# Patient Record
Sex: MALE | Race: WHITE | NOT HISPANIC OR LATINO | Employment: FULL TIME | URBAN - METROPOLITAN AREA
[De-identification: names, ages, dates, MRNs, and addresses within clinical notes are randomized per-mention and may not be internally consistent; named-entity substitution may affect disease eponyms.]

---

## 2019-08-20 ENCOUNTER — HOSPITAL ENCOUNTER (EMERGENCY)
Facility: MEDICAL CENTER | Age: 54
End: 2019-08-20
Attending: EMERGENCY MEDICINE
Payer: COMMERCIAL

## 2019-08-20 ENCOUNTER — APPOINTMENT (OUTPATIENT)
Dept: RADIOLOGY | Facility: MEDICAL CENTER | Age: 54
End: 2019-08-20
Payer: COMMERCIAL

## 2019-08-20 VITALS
SYSTOLIC BLOOD PRESSURE: 136 MMHG | HEIGHT: 70 IN | DIASTOLIC BLOOD PRESSURE: 90 MMHG | WEIGHT: 215 LBS | HEART RATE: 82 BPM | OXYGEN SATURATION: 97 % | TEMPERATURE: 98 F | BODY MASS INDEX: 30.78 KG/M2 | RESPIRATION RATE: 18 BRPM
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
## 2019-08-20 PROCEDURE — 99284 EMERGENCY DEPT VISIT MOD MDM: CPT

## 2019-08-20 PROCEDURE — 700111 HCHG RX REV CODE 636 W/ 250 OVERRIDE (IP): Performed by: EMERGENCY MEDICINE

## 2019-08-20 PROCEDURE — 307740 HCHG GREEN TRAUMA TEAM SERVICES

## 2019-08-20 PROCEDURE — 96372 THER/PROPH/DIAG INJ SC/IM: CPT

## 2019-08-20 RX ORDER — KETOROLAC TROMETHAMINE 30 MG/ML
60 INJECTION, SOLUTION INTRAMUSCULAR; INTRAVENOUS ONCE
Status: COMPLETED | OUTPATIENT
Start: 2019-08-20 | End: 2019-08-20

## 2019-08-20 RX ADMIN — KETOROLAC TROMETHAMINE 60 MG: 30 INJECTION, SOLUTION INTRAMUSCULAR at 20:57

## 2019-08-20 SDOH — HEALTH STABILITY: MENTAL HEALTH: HOW OFTEN DO YOU HAVE A DRINK CONTAINING ALCOHOL?: MONTHLY OR LESS

## 2019-08-20 SDOH — HEALTH STABILITY: MENTAL HEALTH: HOW MANY STANDARD DRINKS CONTAINING ALCOHOL DO YOU HAVE ON A TYPICAL DAY?: 1 OR 2

## 2019-08-20 SDOH — HEALTH STABILITY: MENTAL HEALTH: HOW OFTEN DO YOU HAVE 6 OR MORE DRINKS ON ONE OCCASION?: NEVER
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
## 2019-08-21 NOTE — ED NOTES
Pt discharged home. Explained discharge and medication instructions. Questions and comments addressed. Pt verbalized understanding of instructions. Pt advised to follow-up with Occupational Health or return to ED for any new or worsening of symptoms. Pt is ambulating well and steady on feet. VS stable. 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

## 2020-07-21 ENCOUNTER — HOSPITAL ENCOUNTER (OUTPATIENT)
Dept: LAB | Facility: MEDICAL CENTER | Age: 55
End: 2020-07-21
Attending: FAMILY MEDICINE
Payer: COMMERCIAL

## 2020-07-21 LAB
BASOPHILS # BLD AUTO: 0.6 % (ref 0–1.8)
BASOPHILS # BLD: 0.03 K/UL (ref 0–0.12)
EOSINOPHIL # BLD AUTO: 0.04 K/UL (ref 0–0.51)
EOSINOPHIL NFR BLD: 0.8 % (ref 0–6.9)
ERYTHROCYTE [DISTWIDTH] IN BLOOD BY AUTOMATED COUNT: 44.6 FL (ref 35.9–50)
ESTRADIOL SERPL-MCNC: 28.3 PG/ML
HCT VFR BLD AUTO: 49 % (ref 42–52)
HGB BLD-MCNC: 16.7 G/DL (ref 14–18)
IMM GRANULOCYTES # BLD AUTO: 0.02 K/UL (ref 0–0.11)
IMM GRANULOCYTES NFR BLD AUTO: 0.4 % (ref 0–0.9)
LYMPHOCYTES # BLD AUTO: 1.11 K/UL (ref 1–4.8)
LYMPHOCYTES NFR BLD: 21 % (ref 22–41)
MCH RBC QN AUTO: 32.7 PG (ref 27–33)
MCHC RBC AUTO-ENTMCNC: 34.1 G/DL (ref 33.7–35.3)
MCV RBC AUTO: 95.9 FL (ref 81.4–97.8)
MONOCYTES # BLD AUTO: 0.42 K/UL (ref 0–0.85)
MONOCYTES NFR BLD AUTO: 8 % (ref 0–13.4)
NEUTROPHILS # BLD AUTO: 3.66 K/UL (ref 1.82–7.42)
NEUTROPHILS NFR BLD: 69.2 % (ref 44–72)
NRBC # BLD AUTO: 0 K/UL
NRBC BLD-RTO: 0 /100 WBC
PLATELET # BLD AUTO: 163 K/UL (ref 164–446)
PMV BLD AUTO: 11.5 FL (ref 9–12.9)
PROLACTIN SERPL-MCNC: 13.8 NG/ML (ref 2.1–17.7)
RBC # BLD AUTO: 5.11 M/UL (ref 4.7–6.1)
TESTOST SERPL-MCNC: 548 NG/DL (ref 175–781)
VIT B12 SERPL-MCNC: 603 PG/ML (ref 211–911)
WBC # BLD AUTO: 5.3 K/UL (ref 4.8–10.8)

## 2020-07-21 PROCEDURE — 82670 ASSAY OF TOTAL ESTRADIOL: CPT

## 2020-07-21 PROCEDURE — 84403 ASSAY OF TOTAL TESTOSTERONE: CPT

## 2020-07-21 PROCEDURE — 84146 ASSAY OF PROLACTIN: CPT

## 2020-07-21 PROCEDURE — 82607 VITAMIN B-12: CPT

## 2020-07-21 PROCEDURE — 82542 COL CHROMOTOGRAPHY QUAL/QUAN: CPT

## 2020-07-21 PROCEDURE — 36415 COLL VENOUS BLD VENIPUNCTURE: CPT

## 2020-07-21 PROCEDURE — 85025 COMPLETE CBC W/AUTO DIFF WBC: CPT

## 2020-07-26 LAB — ANDROSTANOLONE SERPL-MCNC: 608.8 PG/ML (ref 106–719)

## 2021-01-29 ENCOUNTER — HOSPITAL ENCOUNTER (OUTPATIENT)
Dept: LAB | Facility: MEDICAL CENTER | Age: 56
End: 2021-01-29
Attending: FAMILY MEDICINE
Payer: COMMERCIAL

## 2021-01-29 LAB
ANION GAP SERPL CALC-SCNC: 8 MMOL/L (ref 7–16)
BUN SERPL-MCNC: 15 MG/DL (ref 8–22)
CALCIUM SERPL-MCNC: 9.6 MG/DL (ref 8.5–10.5)
CHLORIDE SERPL-SCNC: 103 MMOL/L (ref 96–112)
CHOLEST SERPL-MCNC: 265 MG/DL (ref 100–199)
CO2 SERPL-SCNC: 27 MMOL/L (ref 20–33)
CREAT SERPL-MCNC: 1.24 MG/DL (ref 0.5–1.4)
ERYTHROCYTE [DISTWIDTH] IN BLOOD BY AUTOMATED COUNT: 42.9 FL (ref 35.9–50)
ESTRADIOL SERPL-MCNC: <5 PG/ML
FASTING STATUS PATIENT QL REPORTED: NORMAL
GLUCOSE SERPL-MCNC: 86 MG/DL (ref 65–99)
HCT VFR BLD AUTO: 50.5 % (ref 42–52)
HDLC SERPL-MCNC: 47 MG/DL
HGB BLD-MCNC: 17.3 G/DL (ref 14–18)
LDLC SERPL CALC-MCNC: 198 MG/DL
MCH RBC QN AUTO: 32.2 PG (ref 27–33)
MCHC RBC AUTO-ENTMCNC: 34.3 G/DL (ref 33.7–35.3)
MCV RBC AUTO: 94 FL (ref 81.4–97.8)
PLATELET # BLD AUTO: 167 K/UL (ref 164–446)
PMV BLD AUTO: 11.4 FL (ref 9–12.9)
POTASSIUM SERPL-SCNC: 4.3 MMOL/L (ref 3.6–5.5)
RBC # BLD AUTO: 5.37 M/UL (ref 4.7–6.1)
SODIUM SERPL-SCNC: 138 MMOL/L (ref 135–145)
TRIGL SERPL-MCNC: 101 MG/DL (ref 0–149)
WBC # BLD AUTO: 4.4 K/UL (ref 4.8–10.8)

## 2021-01-29 PROCEDURE — 36415 COLL VENOUS BLD VENIPUNCTURE: CPT

## 2021-01-29 PROCEDURE — 84402 ASSAY OF FREE TESTOSTERONE: CPT

## 2021-01-29 PROCEDURE — 80061 LIPID PANEL: CPT

## 2021-01-29 PROCEDURE — 84270 ASSAY OF SEX HORMONE GLOBUL: CPT

## 2021-01-29 PROCEDURE — 84403 ASSAY OF TOTAL TESTOSTERONE: CPT

## 2021-01-29 PROCEDURE — 80048 BASIC METABOLIC PNL TOTAL CA: CPT

## 2021-01-29 PROCEDURE — 82670 ASSAY OF TOTAL ESTRADIOL: CPT

## 2021-01-29 PROCEDURE — 85027 COMPLETE CBC AUTOMATED: CPT

## 2021-01-31 LAB
SHBG SERPL-SCNC: 36 NMOL/L (ref 11–80)
TESTOST FREE MFR SERPL: 2.1 % (ref 1.6–2.9)
TESTOST FREE SERPL-MCNC: 232 PG/ML (ref 47–244)
TESTOST SERPL-MCNC: 1111 NG/DL (ref 300–890)

## 2021-04-09 ENCOUNTER — TELEPHONE (OUTPATIENT)
Dept: CARDIOLOGY | Facility: MEDICAL CENTER | Age: 56
End: 2021-04-09

## 2021-04-09 NOTE — TELEPHONE ENCOUNTER
Chart Prep    Called patient in regards to records for NP appointment with Dr. Downing on 4/22/2021 at 08:00am. To review most recent lab results, ekg, any cardiac testing or ,if she has been treated by a cardiologist. No answer, left voicemail to call back

## 2021-04-09 NOTE — TELEPHONE ENCOUNTER
Requested records from   [facility]  Phone#: [phone] Fax# [phone]  [facility]  Phone# [phone] Fax# [phone]  [facility]  Phone# [phone] Fax# [phone]    Confirmation fax received and scanned into [employer].

## 2021-04-09 NOTE — TELEPHONE ENCOUNTER
Patient called back and stated   That he's had Echo, labs, X-ray's, and ASHLEY done with Dr Ramesh Bejarano with [facility]. Patient did state that Dr Bejarano was going to reach out to TW. Previous cardio was Dr Colmenares with [facility] and Dr Silver [facility] with [facility].    Thank you,    Cyndi CATALAN

## 2021-04-19 ENCOUNTER — HOSPITAL ENCOUNTER (OUTPATIENT)
Dept: RADIOLOGY | Facility: MEDICAL CENTER | Age: 56
End: 2021-04-19
Payer: COMMERCIAL

## 2021-04-19 NOTE — TELEPHONE ENCOUNTER
Pt called asking if you received a CD of his ASHLEY. Pt also would like to know if the pulmonary records came. Please call Pt back at [phone].    Thank you

## 2021-04-20 NOTE — TELEPHONE ENCOUNTER
"Pt called back stating they have more info for you. They said that the image is too big to it on a disk so they are \"going to push it to the [facility] system. Please call the Pt back at [phone].    Thank you,  Mari GARCIA"

## 2021-04-20 NOTE — TELEPHONE ENCOUNTER
Called patient and notified him that records from [facility] was still not received. Patient states he has hard copies of his pulmonary chart and will bring in during his visit to scan into his chart. Have not received CD of ASHLEY as well. Patient will try to retrieve the CD in person at [facility].

## 2021-04-21 ENCOUNTER — HOSPITAL ENCOUNTER (OUTPATIENT)
Dept: RADIOLOGY | Facility: MEDICAL CENTER | Age: 56
End: 2021-04-21
Payer: COMMERCIAL

## 2021-04-22 ENCOUNTER — OFFICE VISIT (OUTPATIENT)
Dept: CARDIOLOGY | Facility: MEDICAL CENTER | Age: 56
End: 2021-04-22
Payer: COMMERCIAL

## 2021-04-22 VITALS
RESPIRATION RATE: 14 BRPM | OXYGEN SATURATION: 96 % | SYSTOLIC BLOOD PRESSURE: 142 MMHG | DIASTOLIC BLOOD PRESSURE: 88 MMHG | HEIGHT: 70 IN | WEIGHT: 219 LBS | BODY MASS INDEX: 31.35 KG/M2 | HEART RATE: 100 BPM

## 2021-04-22 DIAGNOSIS — Q21.12 PFO (PATENT FORAMEN OVALE): ICD-10-CM

## 2021-04-22 DIAGNOSIS — R09.02 HYPOXIA: ICD-10-CM

## 2021-04-22 DIAGNOSIS — Z79.890 LONG-TERM CURRENT USE OF TESTOSTERONE REPLACEMENT THERAPY: ICD-10-CM

## 2021-04-22 DIAGNOSIS — R06.02 SOB (SHORTNESS OF BREATH): ICD-10-CM

## 2021-04-22 PROCEDURE — 99204 OFFICE O/P NEW MOD 45 MIN: CPT | Performed by: INTERNAL MEDICINE

## 2021-04-22 PROCEDURE — 93000 ELECTROCARDIOGRAM COMPLETE: CPT | Performed by: INTERNAL MEDICINE

## 2021-04-22 RX ORDER — TESTOSTERONE CYPIONATE 200 MG/ML
200 INJECTION, SOLUTION INTRAMUSCULAR
COMMUNITY
Start: 2021-02-05 | End: 2021-09-01

## 2021-04-22 RX ORDER — ANASTROZOLE 1 MG/1
1 TABLET ORAL
COMMUNITY
Start: 2021-02-19

## 2021-04-23 LAB — EKG IMPRESSION: NORMAL

## 2021-04-27 ENCOUNTER — TELEPHONE (OUTPATIENT)
Dept: CARDIOLOGY | Facility: MEDICAL CENTER | Age: 56
End: 2021-04-27

## 2021-04-27 NOTE — TELEPHONE ENCOUNTER
Patient called with correct fax number. Please fax notes when done to [phone].    Thank you,    Cyndi CATALAN

## 2021-04-27 NOTE — TELEPHONE ENCOUNTER
FITO    Pt called asking if the  Visit notes from 4/22 could be emailed to him at [email or web address] or faxed to   Fax# [phone].    Thank you

## 2021-04-28 PROBLEM — Z79.890 LONG-TERM CURRENT USE OF TESTOSTERONE REPLACEMENT THERAPY: Status: ACTIVE | Noted: 2021-04-28

## 2021-04-28 PROBLEM — Q21.12 PFO (PATENT FORAMEN OVALE): Status: ACTIVE | Noted: 2021-04-28

## 2021-04-28 PROBLEM — R09.02 HYPOXIA: Status: ACTIVE | Noted: 2021-04-28

## 2021-04-28 NOTE — TELEPHONE ENCOUNTER
Discussed with TW, notes signed, ok to e-mail to pt. Notes sent via e-mail to pt's provided e-mail address.

## 2021-04-28 NOTE — PROGRESS NOTES
Chief Complaint   Patient presents with   • Shortness of Breath       Subjective:   Jason Ray is a 55 y.o. male who presents today for initial consultation regarding exercise-induced hypoxia and dyspnea on exertion.  He is a very active and lifelong athletic  who was completing his yearly VO2 max testing October 2020 when he was noted to have a sudden onset of shortness of breath at peak exercise and sustained desaturation early into recovery down into the mid 80% range.  He does note that he has been more dyspneic on exertion with peak activity in the recent past.  Underwent cardiovascular evaluation including echocardiogram showing right to left shunting with bubble study and subsequent ASHLEY showing a small PFO.  Extensive records are reviewed today and will be chronicled below.  He does not smoke drink excessively or do drugs.  Family history is reviewed and not pertinent.  He is seeking a second opinion.  This has precluded him from working as a .  He has seen pulmonary medicine who feel that there is no pulmonary etiology at play.  These records are reviewed and summarized as follows: Mild restrictive defect on PFTs with normal corrected DLCO.  High-resolution CT scan was pending at the time of that documentation.  Notably the patient also brings recordings of his oxygen saturations during strenuous activity subsequently and at peak exercise with the development of dyspnea there are precipitous drops in his systemic oxygen saturation as read by his pulse oximeter.      History reviewed. No pertinent past medical history.  History reviewed. No pertinent surgical history.  History reviewed. No pertinent family history.  Social History     Socioeconomic History   • Marital status:      Spouse name: Not on file   • Number of children: Not on file   • Years of education: Not on file   • Highest education level: Not on file   Occupational History   • Not on file   Tobacco Use   •  "Smoking status: Never Smoker   • Smokeless tobacco: Never Used   Substance and Sexual Activity   • Alcohol use: Yes   • Drug use: Never   • Sexual activity: Not on file   Other Topics Concern   • Not on file   Social History Narrative   • Not on file     Social Determinants of Health     Financial Resource Strain:    • Difficulty of Paying Living Expenses:    Food Insecurity:    • Worried About Running Out of Food in the Last Year:    • Ran Out of Food in the Last Year:    Transportation Needs:    • Lack of Transportation (Medical):    • Lack of Transportation (Non-Medical):    Physical Activity:    • Days of Exercise per Week:    • Minutes of Exercise per Session:    Stress:    • Feeling of Stress :    Social Connections:    • Frequency of Communication with Friends and Family:    • Frequency of Social Gatherings with Friends and Family:    • Attends Anabaptism Services:    • Active Member of Clubs or Organizations:    • Attends Club or Organization Meetings:    • Marital Status:    Intimate Partner Violence:    • Fear of Current or Ex-Partner:    • Emotionally Abused:    • Physically Abused:    • Sexually Abused:      No Known Allergies  Outpatient Encounter Medications as of 4/22/2021   Medication Sig Dispense Refill   • anastrozole (ARIMIDEX) 1 MG Tab Take 1 mg by mouth every day.     • testosterone cypionate (DEPO-TESTOSTERONE) 200 MG/ML Solution injection 200 mg.     • MAGNESIUM CARBONATE PO Take  by mouth.       No facility-administered encounter medications on file as of 4/22/2021.     Review of Systems   All other systems reviewed and are negative.       Objective:   /88 (BP Location: Left arm, Patient Position: Sitting, BP Cuff Size: Adult)   Pulse 100   Resp 14   Ht 1.778 m (5' 10\")   Wt 99.3 kg (219 lb)   SpO2 96%   BMI 31.42 kg/m²     Physical Exam   Constitutional: He is oriented to person, place, and time. He appears well-developed and well-nourished. No distress.   Athletic  Appears " younger than stated age   HENT:   Head: Normocephalic and atraumatic.   Right Ear: External ear normal.   Left Ear: External ear normal.   Eyes: Pupils are equal, round, and reactive to light. Conjunctivae and EOM are normal. Right eye exhibits no discharge. Left eye exhibits no discharge. No scleral icterus.   Neck: No JVD present. No tracheal deviation present. No thyromegaly present.   Cardiovascular: Normal rate, regular rhythm and intact distal pulses. PMI is not displaced. Exam reveals no gallop and no friction rub.   No murmur heard.  Pulses:       Carotid pulses are 2+ on the right side and 2+ on the left side.       Radial pulses are 2+ on the left side.        Popliteal pulses are 2+ on the right side and 2+ on the left side.        Dorsalis pedis pulses are 2+ on the right side and 2+ on the left side.        Posterior tibial pulses are 2+ on the right side and 2+ on the left side.   Pulmonary/Chest: Effort normal and breath sounds normal. No respiratory distress. He has no wheezes. He has no rales. He exhibits no tenderness.   Abdominal: Soft. Bowel sounds are normal. He exhibits no distension. There is no abdominal tenderness.   Musculoskeletal:         General: No tenderness, deformity or edema. Normal range of motion.      Cervical back: Normal range of motion and neck supple.   Neurological: He is alert and oriented to person, place, and time. No cranial nerve deficit (cranial nerves II through XII grossly intact). Coordination normal.   Skin: Skin is warm and dry. No rash noted. He is not diaphoretic. No erythema. No pallor. Nails show clubbing.   Psychiatric: He has a normal mood and affect. His behavior is normal. Thought content normal.   Vitals reviewed.    LABS:  Lab Results   Component Value Date/Time    CHOLSTRLTOT 265 (H) 01/29/2021 11:32 AM     (H) 01/29/2021 11:32 AM    HDL 47 01/29/2021 11:32 AM    TRIGLYCERIDE 101 01/29/2021 11:32 AM       Lab Results   Component Value Date/Time     WBC 4.4 (L) 01/29/2021 11:32 AM    RBC 5.37 01/29/2021 11:32 AM    HEMOGLOBIN 17.3 01/29/2021 11:32 AM    HEMATOCRIT 50.5 01/29/2021 11:32 AM    MCV 94.0 01/29/2021 11:32 AM    NEUTSPOLYS 69.20 07/21/2020 12:53 PM    LYMPHOCYTES 21.00 (L) 07/21/2020 12:53 PM    MONOCYTES 8.00 07/21/2020 12:53 PM    EOSINOPHILS 0.80 07/21/2020 12:53 PM    BASOPHILS 0.60 07/21/2020 12:53 PM     Lab Results   Component Value Date/Time    SODIUM 138 01/29/2021 11:32 AM    POTASSIUM 4.3 01/29/2021 11:32 AM    CHLORIDE 103 01/29/2021 11:32 AM    CO2 27 01/29/2021 11:32 AM    GLUCOSE 86 01/29/2021 11:32 AM    BUN 15 01/29/2021 11:32 AM    CREATININE 1.24 01/29/2021 11:32 AM         No results found for: ALKPHOSPHAT, ASTSGOT, ALTSGPT, TBILIRUBIN   No results found for: BNPBTYPENAT   No results found for: TSH  No results found for: PROTHROMBTM, INR     Transesophageal echocardiogram (2/18/2021):  Normal left jugular size function LVEF 60 to 65% with mild LVH.  Mildly dilated right atrium.  No significant valvular abnormalities.  Normal RV size wall thickness and function.  Saline bubble study performed with few bubbles crossing the interatrial septum most consistent with a small PFO.  Completed by Dr. Valerio. [facility] cardiology.  Result located in media.    CT chest without contrast (12/2/2020):  Impression: Expiratory images demonstrate mild air trapping  Result located in media completed at [facility].  Images were reviewed today.  Patient indicates he was unable to cooperate fully with breath-holding and exhaling due to shoulder discomfort.    Treadmill stress test [facility] (2/4/2021):  Good functional capacity achieving 84% maximum predicted heart rate, 13.4 METS.  Tracings are unavailable for review.  Patient indicates it was unremarkable for ischemia.  Oxygen saturations were not recorded in the documentation available from this test today located in [place].  Physician interpretation from the notations  indicate no significant desaturations with the lowest SPO2 of 95%.    Transthoracic echocardiogram (11/11/2020):      Overnight oximetry (12/8/2020): Reviewed and mildly abnormal with an SPO2 time less than 88% of 0.8% of the recording time.  Average saturation of 90%.  No large desaturation pattern to indicate sleep disordered breathing.    Laboratory studies 2/18/2021: BUN 13, calcium 8.7, CO2 27, chloride 111, creatinine 1.30, glucose 94, calcium 4.3, sodium 142, hemoglobin 16.8, hematocrit 48.4, platelets normal, COVID-19 test negative.    Assessment:     1. Hypoxia  MR-CARDIAC MORPH/FUNC WITH & W/O   2. PFO (patent foramen ovale)     3. SOB (shortness of breath)  EKG    MR-CARDIAC MORPH/FUNC WITH & W/O   4. Long-term current use of testosterone replacement therapy         Medical Decision Making:  Today's Assessment / Status / Plan:     Unfortunately continues to suffer from some dyspnea at maximum workload.  Review of his transthoracic images with bubble study that are available in our records do show a significant right to left shunt more so than commented on during his ASLHEY.  Is likely due to his ability to participate in the Valsalva maneuver during his nonsedated transthoracic study that demonstrated the significance of this shunting.  It does appear to be significant as there is an immediate and large volume of right to left shunting on this bubble study.  This could under periods of significant Valsalva strain such as heavy workloads because desaturations.  There is some disagreement with the studies thus far reviewed regarding chamber sizes.  It seems quite significant given that he does have clubbing present on physical examination.  I would like to assess with a different modality for more accurate quantitation of his chamber sizes and to assess for any anomalous venous return or other sources of shunting outside of his PFO.  If nothing is revealing, consideration for left and right heart  catheterization with shunt run would be reasonable and if relatively normal, it would be very reasonable to consider PFO closure with expectant management to see if this resolves his exertional hypoxemia.  If it does not then I would recommend specialty consultation at a tertiary care center such as the [facility], etc.    With regard activity he indicates he has been told not to do any cardio.  He only has symptoms at maximum exertion and completed a maximum treadmill test with 13 METS of exertion and no desaturations recently.  I suggest he return to symptom tolerated moderate cardiovascular activity during the remainder of his evaluation.  So that deconditioning does not become a concomitant issue for him, which he indicates it already has begun to be present.    He will follow-up after testing.    Thank you for this interesting consultation. It was my pleasure to see Jason Ray today.    Antonio Downing MD, FACC, [facility]  Division of Interventional Cardiology  [facility]

## 2021-07-02 ENCOUNTER — HOSPITAL ENCOUNTER (OUTPATIENT)
Dept: RADIOLOGY | Facility: MEDICAL CENTER | Age: 56
End: 2021-07-02
Attending: INTERNAL MEDICINE
Payer: COMMERCIAL

## 2021-07-02 DIAGNOSIS — R09.02 HYPOXIA: ICD-10-CM

## 2021-07-02 DIAGNOSIS — R06.02 SOB (SHORTNESS OF BREATH): ICD-10-CM

## 2021-07-02 PROCEDURE — 700117 HCHG RX CONTRAST REV CODE 255: Performed by: INTERNAL MEDICINE

## 2021-07-02 PROCEDURE — 75561 CARDIAC MRI FOR MORPH W/DYE: CPT

## 2021-07-02 PROCEDURE — A9577 INJ MULTIHANCE: HCPCS | Performed by: INTERNAL MEDICINE

## 2021-07-02 RX ADMIN — GADOBENATE DIMEGLUMINE 20 ML: 529 INJECTION, SOLUTION INTRAVENOUS at 14:30

## 2021-07-08 ENCOUNTER — TELEPHONE (OUTPATIENT)
Dept: CARDIOLOGY | Facility: MEDICAL CENTER | Age: 56
End: 2021-07-08

## 2021-07-08 NOTE — TELEPHONE ENCOUNTER
TW    Patient called and would like the results of his MRI Cardiac test. Please call him.    Thank you,    Cyndi CATALAN

## 2021-07-08 NOTE — TELEPHONE ENCOUNTER
TW    Patient called and would like to get the results of his Cardiac test done on 7-2. Please call him.    Thank you,    Cyndi CATALAN

## 2021-08-05 ENCOUNTER — PATIENT MESSAGE (OUTPATIENT)
Dept: CARDIOLOGY | Facility: MEDICAL CENTER | Age: 56
End: 2021-08-05

## 2021-08-05 NOTE — PATIENT COMMUNICATION
Patient is needing an answer regarding his MR- Cardiac.  Test was borderline and TW wanting LS (?) to review.  I told him I would try to contact someone who could help.    To LS:  Please advise if you can assist, or who I should redirect this to.

## 2021-08-07 NOTE — PROGRESS NOTES
I spoke to Dr. Downing about the MRI.    I think the images were adequate to exclude anomalous venous return.  The right-sided chambers were not significantly enlarged. No shunt appreciated.    I believe Dr. Downing is considering a right heart catheterization with shunt run.    LS

## 2021-08-09 ENCOUNTER — TELEPHONE (OUTPATIENT)
Dept: CARDIOLOGY | Facility: MEDICAL CENTER | Age: 56
End: 2021-08-09

## 2021-08-09 NOTE — TELEPHONE ENCOUNTER
Nirali Sen M.D. 3 days ago        I spoke to Dr. Downing about the MRI.     I think the images were adequate to exclude anomalous venous return.  The right-sided chambers were not significantly enlarged. No shunt appreciated.     I believe Dr. Downing is considering a right heart catheterization with shunt run.     LS        -----------------------------------------------------------------------    Contacted pt and provided him with the above interpretation from Dr. Sen. Explained that further interpretation would need to come from Dr. Downing at his upcoming appt on the 13th. Briefly explained the right heart cath procedure and the possible indications. Pt will prepare his additional questions for his upcoming appt.

## 2021-08-09 NOTE — TELEPHONE ENCOUNTER
Regarding: FW: Test Result Question  Contact: [phone]  Vincent- Can you please call patient ASAP and let him know the possible plan, and schedule with TW if needed (probable).  ----- Message -----  From: Jeniffer Ramos R.N.  Sent: 8/5/2021  11:59 AM PDT  To: Jeniffer Ramos R.N., #  Subject: Test Result Question                             ----- Message from Jeniffer Ramos R.N. sent at 8/5/2021 11:59 AM PDT -----       ----- Message from Jason Ray to Antonio Downing M.D. sent at 8/5/2021  8:21 AM -----   I have an appointment on 8/13 and I am not aware if a cardiology radiologist had read my cardiac MRI from 7/2. Any information would be helpful. ThanksNaya

## 2021-08-12 NOTE — PATIENT COMMUNICATION
Antonio Downing M.D.  You 19 hours ago (2:56 PM)     I called and left a message reiterating and summarizing these things.  Plan to see him on his upcoming appointment to discuss further.      Noted. Pt has FV with TW 08/13/21.

## 2021-08-13 ENCOUNTER — OFFICE VISIT (OUTPATIENT)
Dept: CARDIOLOGY | Facility: MEDICAL CENTER | Age: 56
End: 2021-08-13
Payer: COMMERCIAL

## 2021-08-13 VITALS
DIASTOLIC BLOOD PRESSURE: 88 MMHG | WEIGHT: 214 LBS | HEIGHT: 70 IN | OXYGEN SATURATION: 98 % | HEART RATE: 64 BPM | SYSTOLIC BLOOD PRESSURE: 140 MMHG | BODY MASS INDEX: 30.64 KG/M2

## 2021-08-13 DIAGNOSIS — R06.02 SOB (SHORTNESS OF BREATH): ICD-10-CM

## 2021-08-13 DIAGNOSIS — Q21.12 PFO (PATENT FORAMEN OVALE): ICD-10-CM

## 2021-08-13 DIAGNOSIS — R09.02 HYPOXIA: ICD-10-CM

## 2021-08-13 PROCEDURE — 99214 OFFICE O/P EST MOD 30 MIN: CPT | Performed by: INTERNAL MEDICINE

## 2021-08-13 NOTE — PROGRESS NOTES
Chief Complaint   Patient presents with   • Shortness of Breath     follow up        Subjective:   Jason Ray is a 55 y.o. male who presents today for initial follow-up regarding exercise-induced hypoxia and dyspnea on exertion.  He is a very active and lifelong athletic  who was completing his yearly VO2 max testing October 2020 when he was noted to have a sudden onset of shortness of breath at peak exercise and sustained desaturation early into recovery down into the mid 80% range.  He does note that he has been more dyspneic on exertion with peak activity in the recent past.  Underwent cardiovascular evaluation including echocardiogram showing right to left shunting with bubble study and subsequent ASHLEY showing a small PFO.  Extensive records are reviewed today and will be chronicled below.  He does not smoke drink excessively or do drugs.  Family history is reviewed and not pertinent.  He is seeking a second opinion.  This has precluded him from working as a .  He has seen pulmonary medicine who feel that there is no pulmonary etiology at play.  These records are reviewed and summarized as follows: Mild restrictive defect on PFTs with normal corrected DLCO.  High-resolution CT scan was pending at the time of that documentation.  Notably the patient also brings recordings of his oxygen saturations during strenuous activity subsequently and at peak exercise with the development of dyspnea there are precipitous drops in his systemic oxygen saturation as read by his pulse oximeter.    In the interim since last visit he underwent cardiac MRI which was officially interpreted by radiology but also over read by our cardiac imaging expert Dr. Diamond.  Overall he has normal right and left ventricular chamber sizes as well as bilateral atrial sizes, ejection fraction is normal.  No anomalous pulmonary venous return was noted nor any obvious intracardiac shunting.  We did discuss however that a PFO  in particular is poorly imaged on MRI and therefore the absence of seeing it on this study does not exclude its presence.  Indeed it has been documented on other studies which are appropriate imaging modalities for this.  He is return to physical activity as working out and feels well.      History reviewed. No pertinent past medical history.  History reviewed. No pertinent surgical history.  History reviewed. No pertinent family history.  Social History     Socioeconomic History   • Marital status:      Spouse name: Not on file   • Number of children: Not on file   • Years of education: Not on file   • Highest education level: Not on file   Occupational History   • Not on file   Tobacco Use   • Smoking status: Never Smoker   • Smokeless tobacco: Never Used   Vaping Use   • Vaping Use: Never used   Substance and Sexual Activity   • Alcohol use: Yes   • Drug use: Never   • Sexual activity: Not on file   Other Topics Concern   • Not on file   Social History Narrative   • Not on file     Social Determinants of Health     Financial Resource Strain:    • Difficulty of Paying Living Expenses:    Food Insecurity:    • Worried About Running Out of Food in the Last Year:    • Ran Out of Food in the Last Year:    Transportation Needs:    • Lack of Transportation (Medical):    • Lack of Transportation (Non-Medical):    Physical Activity:    • Days of Exercise per Week:    • Minutes of Exercise per Session:    Stress:    • Feeling of Stress :    Social Connections:    • Frequency of Communication with Friends and Family:    • Frequency of Social Gatherings with Friends and Family:    • Attends Gnosticism Services:    • Active Member of Clubs or Organizations:    • Attends Club or Organization Meetings:    • Marital Status:    Intimate Partner Violence:    • Fear of Current or Ex-Partner:    • Emotionally Abused:    • Physically Abused:    • Sexually Abused:      No Known Allergies  Outpatient Encounter Medications as of  "8/13/2021   Medication Sig Dispense Refill   • anastrozole (ARIMIDEX) 1 MG Tab Take 1 mg by mouth every 7 days.     • testosterone cypionate (DEPO-TESTOSTERONE) 200 MG/ML Solution injection 200 mg.     • MAGNESIUM CARBONATE PO Take  by mouth.       No facility-administered encounter medications on file as of 8/13/2021.     Review of Systems   All other systems reviewed and are negative.       Objective:   /88 (BP Location: Left arm, Patient Position: Sitting)   Pulse 64   Ht 1.778 m (5' 10\")   Wt 97.1 kg (214 lb)   SpO2 98%   BMI 30.71 kg/m²     Physical Exam   Constitutional: He is oriented to person, place, and time. He appears well-developed. No distress.   Athletic  Appears younger than stated age   HENT:   Head: Normocephalic and atraumatic.   Right Ear: External ear normal.   Left Ear: External ear normal.   Eyes: Pupils are equal, round, and reactive to light. Conjunctivae are normal. Right eye exhibits no discharge. Left eye exhibits no discharge. No scleral icterus.   Neck: No JVD present. No tracheal deviation present. No thyromegaly present.   Cardiovascular: Normal rate and regular rhythm. PMI is not displaced. Exam reveals no gallop and no friction rub.   No murmur heard.  Pulses:       Carotid pulses are 2+ on the right side and 2+ on the left side.       Radial pulses are 2+ on the left side.        Popliteal pulses are 2+ on the right side and 2+ on the left side.        Dorsalis pedis pulses are 2+ on the right side and 2+ on the left side.        Posterior tibial pulses are 2+ on the right side and 2+ on the left side.   Pulmonary/Chest: Effort normal and breath sounds normal. No respiratory distress. He has no wheezes. He has no rales. He exhibits no tenderness.   Abdominal: Soft. Bowel sounds are normal. He exhibits no distension. There is no abdominal tenderness.   Musculoskeletal:         General: No tenderness or deformity. Normal range of motion.      Cervical back: Normal range of " motion and neck supple.   Neurological: He is alert and oriented to person, place, and time. No cranial nerve deficit (cranial nerves II through XII grossly intact). Coordination normal.   Skin: Skin is warm and dry. No rash noted. He is not diaphoretic. No erythema. No pallor. Nails show clubbing.   Psychiatric: His behavior is normal. Thought content normal.   Vitals reviewed.    LABS:  Lab Results   Component Value Date/Time    CHOLSTRLTOT 265 (H) 01/29/2021 11:32 AM     (H) 01/29/2021 11:32 AM    HDL 47 01/29/2021 11:32 AM    TRIGLYCERIDE 101 01/29/2021 11:32 AM       Lab Results   Component Value Date/Time    WBC 4.4 (L) 01/29/2021 11:32 AM    RBC 5.37 01/29/2021 11:32 AM    HEMOGLOBIN 17.3 01/29/2021 11:32 AM    HEMATOCRIT 50.5 01/29/2021 11:32 AM    MCV 94.0 01/29/2021 11:32 AM    NEUTSPOLYS 69.20 07/21/2020 12:53 PM    LYMPHOCYTES 21.00 (L) 07/21/2020 12:53 PM    MONOCYTES 8.00 07/21/2020 12:53 PM    EOSINOPHILS 0.80 07/21/2020 12:53 PM    BASOPHILS 0.60 07/21/2020 12:53 PM     Lab Results   Component Value Date/Time    SODIUM 138 01/29/2021 11:32 AM    POTASSIUM 4.3 01/29/2021 11:32 AM    CHLORIDE 103 01/29/2021 11:32 AM    CO2 27 01/29/2021 11:32 AM    GLUCOSE 86 01/29/2021 11:32 AM    BUN 15 01/29/2021 11:32 AM    CREATININE 1.24 01/29/2021 11:32 AM         No results found for: ALKPHOSPHAT, ASTSGOT, ALTSGPT, TBILIRUBIN   No results found for: BNPBTYPENAT   No results found for: TSH  No results found for: PROTHROMBTM, INR     Cardiac MRI results as above    Transesophageal echocardiogram (2/18/2021):  Normal left jugular size function LVEF 60 to 65% with mild LVH.  Mildly dilated right atrium.  No significant valvular abnormalities.  Normal RV size wall thickness and function.  Saline bubble study performed with few bubbles crossing the interatrial septum most consistent with a small PFO.  Completed by Dr. Valerio. [facility]'s cardiology.  Result located in media.    CT chest without contrast  (12/2/2020):  Impression: Expiratory images demonstrate mild air trapping  Result located in media completed at [facility].  Images were reviewed today.  Patient indicates he was unable to cooperate fully with breath-holding and exhaling due to shoulder discomfort.    Treadmill stress test [facility] (2/4/2021):  Good functional capacity achieving 84% maximum predicted heart rate, 13.4 METS.  Tracings are unavailable for review.  Patient indicates it was unremarkable for ischemia.  Oxygen saturations were not recorded in the documentation available from this test today located in media.  Physician interpretation from the notations indicate no significant desaturations with the lowest SPO2 of 95%.    Transthoracic echocardiogram (11/11/2020):      Overnight oximetry (12/8/2020): Reviewed and mildly abnormal with an SPO2 time less than 88% of 0.8% of the recording time.  Average saturation of 90%.  No large desaturation pattern to indicate sleep disordered breathing.    Laboratory studies 2/18/2021: BUN 13, calcium 8.7, CO2 27, chloride 111, creatinine 1.30, glucose 94, calcium 4.3, sodium 142, hemoglobin 16.8, hematocrit 48.4, platelets normal, COVID-19 test negative.    Assessment:     1. Hypoxia  CL-RIGHT HEART CATHETERIZATION   2. PFO (patent foramen ovale)  CL-RIGHT HEART CATHETERIZATION   3. SOB (shortness of breath)         Medical Decision Making:  Today's Assessment / Status / Plan:     Unfortunately continues to suffer from some dyspnea at maximum workload.  Review of his transthoracic images with bubble study that are available in our records do show a significant right to left shunt more so than commented on during his ASHLEY.  Is likely due to his ability to participate in the Valsalva maneuver during his nonsedated transthoracic study that demonstrated the significance of this shunting.  It does appear to be significant as there is an immediate and large volume of right to left  shunting on this bubble study.  This could under periods of significant Valsalva strain such as heavy workloads because desaturations.      Cardiac MRI returns with no other sources of intracardiac or extracardiac shunting outside of his PFO.  I think a right heart catheterization with shunt run is appropriate as one final testing strategy followed by PFO closure to see if this improves his symptomology and physical exam findings.  We discussed the signs of great length today.    1.  Right heart catheterization with shunt run  2.  PFO is present and well-documented with right to left shunting representing an intracardiac shunt..  Absence of its ability to be seen on MRI is expected as this is not an appropriate modality to evaluate the presence or absence of this. This information is provided specifically to assist in the reinstatement of his coverage by his insurance provider.  3.  If his testing is appropriate I would recommend PFO closure and will refer him to  for this.    He will follow-up after testing.    Thank you for this interesting consultation. It was my pleasure to see Jason Ray today.    Antonio Downing MD, FACC, [facility]  Division of Interventional Cardiology  [facility]

## 2021-08-16 ENCOUNTER — TELEPHONE (OUTPATIENT)
Dept: CARDIOLOGY | Facility: MEDICAL CENTER | Age: 56
End: 2021-08-16

## 2021-08-20 ENCOUNTER — HOSPITAL ENCOUNTER (OUTPATIENT)
Dept: LAB | Facility: MEDICAL CENTER | Age: 56
End: 2021-08-20
Attending: FAMILY MEDICINE
Payer: COMMERCIAL

## 2021-08-20 LAB
ANION GAP SERPL CALC-SCNC: 10 MMOL/L (ref 7–16)
BUN SERPL-MCNC: 25 MG/DL (ref 8–22)
CALCIUM SERPL-MCNC: 9.6 MG/DL (ref 8.5–10.5)
CHLORIDE SERPL-SCNC: 104 MMOL/L (ref 96–112)
CHOLEST SERPL-MCNC: 231 MG/DL (ref 100–199)
CO2 SERPL-SCNC: 25 MMOL/L (ref 20–33)
CREAT SERPL-MCNC: 1.25 MG/DL (ref 0.5–1.4)
ERYTHROCYTE [DISTWIDTH] IN BLOOD BY AUTOMATED COUNT: 46.1 FL (ref 35.9–50)
GLUCOSE SERPL-MCNC: 88 MG/DL (ref 65–99)
HCT VFR BLD AUTO: 52 % (ref 42–52)
HDLC SERPL-MCNC: 51 MG/DL
HGB BLD-MCNC: 17.9 G/DL (ref 14–18)
LDLC SERPL CALC-MCNC: 152 MG/DL
MCH RBC QN AUTO: 33.1 PG (ref 27–33)
MCHC RBC AUTO-ENTMCNC: 34.4 G/DL (ref 33.7–35.3)
MCV RBC AUTO: 96.1 FL (ref 81.4–97.8)
PLATELET # BLD AUTO: 179 K/UL (ref 164–446)
PMV BLD AUTO: 11.5 FL (ref 9–12.9)
POTASSIUM SERPL-SCNC: 5.3 MMOL/L (ref 3.6–5.5)
RBC # BLD AUTO: 5.41 M/UL (ref 4.7–6.1)
SODIUM SERPL-SCNC: 139 MMOL/L (ref 135–145)
TRIGL SERPL-MCNC: 142 MG/DL (ref 0–149)
WBC # BLD AUTO: 4.8 K/UL (ref 4.8–10.8)

## 2021-08-20 PROCEDURE — 84270 ASSAY OF SEX HORMONE GLOBUL: CPT

## 2021-08-20 PROCEDURE — 84402 ASSAY OF FREE TESTOSTERONE: CPT

## 2021-08-20 PROCEDURE — 36415 COLL VENOUS BLD VENIPUNCTURE: CPT

## 2021-08-20 PROCEDURE — 80061 LIPID PANEL: CPT

## 2021-08-20 PROCEDURE — 80048 BASIC METABOLIC PNL TOTAL CA: CPT

## 2021-08-20 PROCEDURE — 82670 ASSAY OF TOTAL ESTRADIOL: CPT

## 2021-08-20 PROCEDURE — 85027 COMPLETE CBC AUTOMATED: CPT

## 2021-08-20 PROCEDURE — 84403 ASSAY OF TOTAL TESTOSTERONE: CPT

## 2021-08-23 LAB
SHBG SERPL-SCNC: 41 NMOL/L (ref 11–80)
TESTOST FREE MFR SERPL: 1.6 % (ref 1.6–2.9)
TESTOST FREE SERPL-MCNC: 41 PG/ML (ref 47–244)
TESTOST SERPL-MCNC: 259 NG/DL (ref 300–890)

## 2021-08-24 ENCOUNTER — TELEPHONE (OUTPATIENT)
Dept: CARDIOLOGY | Facility: MEDICAL CENTER | Age: 56
End: 2021-08-24

## 2021-08-24 LAB — ESTRADIOL SERPL HS-MCNC: 3.9 PG/ML (ref 10–42)

## 2021-08-24 NOTE — TELEPHONE ENCOUNTER
----- Message from Sulema Martinez R.N. sent at 8/24/2021  9:06 AM PDT -----  Slim Baac,    I got a mychart message from this pt asking about scheduling a heart cath. It looks like TW ordered right heart cath on 08/13/21, I'm sorry I wasn't made aware of it. Could you please call pt to schedule?    Thanks,  Sulema

## 2021-08-24 NOTE — TELEPHONE ENCOUNTER
Patient is scheduled on 9-3-21 for a RHC with Dr. Downing. No meds to stop and patient to check in at 6:00 for a 7:30 procedure. H&P was done on 8-13-21 by Dr. Downing. Pre admit to call patient.

## 2021-08-31 ENCOUNTER — APPOINTMENT (OUTPATIENT)
Dept: ADMISSIONS | Facility: MEDICAL CENTER | Age: 56
End: 2021-08-31
Payer: COMMERCIAL

## 2021-09-01 ENCOUNTER — PRE-ADMISSION TESTING (OUTPATIENT)
Dept: ADMISSIONS | Facility: MEDICAL CENTER | Age: 56
End: 2021-09-01
Attending: INTERNAL MEDICINE
Payer: COMMERCIAL

## 2021-09-01 DIAGNOSIS — Z01.810 PRE-OPERATIVE CARDIOVASCULAR EXAMINATION: ICD-10-CM

## 2021-09-01 DIAGNOSIS — Z01.812 PRE-OPERATIVE LABORATORY EXAMINATION: ICD-10-CM

## 2021-09-01 LAB
ALBUMIN SERPL BCP-MCNC: 4.3 G/DL (ref 3.2–4.9)
ALBUMIN/GLOB SERPL: 1.8 G/DL
ALP SERPL-CCNC: 45 U/L (ref 30–99)
ALT SERPL-CCNC: 40 U/L (ref 2–50)
ANION GAP SERPL CALC-SCNC: 13 MMOL/L (ref 7–16)
APTT PPP: 28.1 SEC (ref 24.7–36)
AST SERPL-CCNC: 22 U/L (ref 12–45)
BILIRUB SERPL-MCNC: 0.7 MG/DL (ref 0.1–1.5)
BUN SERPL-MCNC: 20 MG/DL (ref 8–22)
CALCIUM SERPL-MCNC: 9.5 MG/DL (ref 8.5–10.5)
CHLORIDE SERPL-SCNC: 101 MMOL/L (ref 96–112)
CO2 SERPL-SCNC: 23 MMOL/L (ref 20–33)
CREAT SERPL-MCNC: 1.08 MG/DL (ref 0.5–1.4)
EKG IMPRESSION: NORMAL
ERYTHROCYTE [DISTWIDTH] IN BLOOD BY AUTOMATED COUNT: 43.8 FL (ref 35.9–50)
GLOBULIN SER CALC-MCNC: 2.4 G/DL (ref 1.9–3.5)
GLUCOSE SERPL-MCNC: 95 MG/DL (ref 65–99)
HCT VFR BLD AUTO: 47.8 % (ref 42–52)
HGB BLD-MCNC: 16.9 G/DL (ref 14–18)
INR PPP: 1 (ref 0.87–1.13)
MCH RBC QN AUTO: 33.2 PG (ref 27–33)
MCHC RBC AUTO-ENTMCNC: 35.4 G/DL (ref 33.7–35.3)
MCV RBC AUTO: 93.9 FL (ref 81.4–97.8)
PLATELET # BLD AUTO: 165 K/UL (ref 164–446)
PMV BLD AUTO: 11 FL (ref 9–12.9)
POTASSIUM SERPL-SCNC: 4.4 MMOL/L (ref 3.6–5.5)
PROT SERPL-MCNC: 6.7 G/DL (ref 6–8.2)
PROTHROMBIN TIME: 12.9 SEC (ref 12–14.6)
RBC # BLD AUTO: 5.09 M/UL (ref 4.7–6.1)
SODIUM SERPL-SCNC: 137 MMOL/L (ref 135–145)
WBC # BLD AUTO: 6.1 K/UL (ref 4.8–10.8)

## 2021-09-01 PROCEDURE — U0005 INFEC AGEN DETEC AMPLI PROBE: HCPCS

## 2021-09-01 PROCEDURE — 93005 ELECTROCARDIOGRAM TRACING: CPT

## 2021-09-01 PROCEDURE — 85730 THROMBOPLASTIN TIME PARTIAL: CPT

## 2021-09-01 PROCEDURE — C9803 HOPD COVID-19 SPEC COLLECT: HCPCS

## 2021-09-01 PROCEDURE — 85610 PROTHROMBIN TIME: CPT

## 2021-09-01 PROCEDURE — 36415 COLL VENOUS BLD VENIPUNCTURE: CPT

## 2021-09-01 PROCEDURE — 93010 ELECTROCARDIOGRAM REPORT: CPT | Performed by: INTERNAL MEDICINE

## 2021-09-01 PROCEDURE — 85027 COMPLETE CBC AUTOMATED: CPT

## 2021-09-01 PROCEDURE — 80053 COMPREHEN METABOLIC PANEL: CPT

## 2021-09-01 PROCEDURE — U0003 INFECTIOUS AGENT DETECTION BY NUCLEIC ACID (DNA OR RNA); SEVERE ACUTE RESPIRATORY SYNDROME CORONAVIRUS 2 (SARS-COV-2) (CORONAVIRUS DISEASE [COVID-19]), AMPLIFIED PROBE TECHNIQUE, MAKING USE OF HIGH THROUGHPUT TECHNOLOGIES AS DESCRIBED BY CMS-2020-01-R: HCPCS

## 2021-09-03 ENCOUNTER — APPOINTMENT (OUTPATIENT)
Dept: CARDIOLOGY | Facility: MEDICAL CENTER | Age: 56
End: 2021-09-03
Attending: INTERNAL MEDICINE
Payer: COMMERCIAL

## 2021-09-03 ENCOUNTER — HOSPITAL ENCOUNTER (OUTPATIENT)
Facility: MEDICAL CENTER | Age: 56
End: 2021-09-03
Attending: INTERNAL MEDICINE | Admitting: INTERNAL MEDICINE
Payer: COMMERCIAL

## 2021-09-03 VITALS
WEIGHT: 212.52 LBS | DIASTOLIC BLOOD PRESSURE: 87 MMHG | BODY MASS INDEX: 30.43 KG/M2 | HEART RATE: 60 BPM | TEMPERATURE: 97.8 F | SYSTOLIC BLOOD PRESSURE: 133 MMHG | HEIGHT: 70 IN | RESPIRATION RATE: 16 BRPM | OXYGEN SATURATION: 94 %

## 2021-09-03 DIAGNOSIS — Q21.12 PFO (PATENT FORAMEN OVALE): ICD-10-CM

## 2021-09-03 DIAGNOSIS — R09.02 HYPOXIA: ICD-10-CM

## 2021-09-03 LAB
BASE EXCESS BLDV CALC-SCNC: -2 MMOL/L (ref -4–3)
BASE EXCESS BLDV CALC-SCNC: -3 MMOL/L (ref -4–3)
BASE EXCESS BLDV CALC-SCNC: 0 MMOL/L (ref -4–3)
BODY TEMPERATURE: ABNORMAL DEGREES
CA-I BLD ISE-SCNC: 1.1 MMOL/L (ref 1.1–1.3)
CA-I BLD ISE-SCNC: 1.23 MMOL/L (ref 1.1–1.3)
CA-I BLD ISE-SCNC: 1.24 MMOL/L (ref 1.1–1.3)
CO2 BLDV-SCNC: 25 MMOL/L (ref 20–33)
CO2 BLDV-SCNC: 25 MMOL/L (ref 20–33)
CO2 BLDV-SCNC: 27 MMOL/L (ref 20–33)
HCO3 BLDV-SCNC: 23.1 MMOL/L (ref 24–28)
HCO3 BLDV-SCNC: 23.6 MMOL/L (ref 24–28)
HCO3 BLDV-SCNC: 26 MMOL/L (ref 24–28)
HCT VFR BLD CALC: 43 % (ref 42–52)
HCT VFR BLD CALC: 46 % (ref 42–52)
HCT VFR BLD CALC: 46 % (ref 42–52)
HGB BLD-MCNC: 14.6 G/DL (ref 14–18)
HGB BLD-MCNC: 15.6 G/DL (ref 14–18)
HGB BLD-MCNC: 15.6 G/DL (ref 14–18)
PCO2 BLDV: 41.1 MMHG (ref 41–51)
PCO2 BLDV: 44.6 MMHG (ref 41–51)
PCO2 BLDV: 46.1 MMHG (ref 41–51)
PH BLDV: 7.31 [PH] (ref 7.31–7.45)
PH BLDV: 7.37 [PH] (ref 7.31–7.45)
PH BLDV: 7.37 [PH] (ref 7.31–7.45)
PO2 BLDV: 32 MMHG (ref 25–40)
PO2 BLDV: 41 MMHG (ref 25–40)
PO2 BLDV: 42 MMHG (ref 25–40)
POTASSIUM BLD-SCNC: 3.8 MMOL/L (ref 3.6–5.5)
POTASSIUM BLD-SCNC: 3.9 MMOL/L (ref 3.6–5.5)
POTASSIUM BLD-SCNC: 4.3 MMOL/L (ref 3.6–5.5)
SAO2 % BLDV: 59 %
SAO2 % BLDV: 72 %
SAO2 % BLDV: 75 %
SARS-COV-2 RNA RESP QL NAA+PROBE: NOTDETECTED
SODIUM BLD-SCNC: 131 MMOL/L (ref 135–145)
SODIUM BLD-SCNC: 140 MMOL/L (ref 135–145)
SODIUM BLD-SCNC: 144 MMOL/L (ref 135–145)
SPECIMEN DRAWN FROM PATIENT: ABNORMAL
SPECIMEN SOURCE: NORMAL

## 2021-09-03 PROCEDURE — 82803 BLOOD GASES ANY COMBINATION: CPT | Mod: 91

## 2021-09-03 PROCEDURE — 84132 ASSAY OF SERUM POTASSIUM: CPT | Mod: 91

## 2021-09-03 PROCEDURE — 700105 HCHG RX REV CODE 258: Performed by: INTERNAL MEDICINE

## 2021-09-03 PROCEDURE — 84295 ASSAY OF SERUM SODIUM: CPT

## 2021-09-03 PROCEDURE — 700111 HCHG RX REV CODE 636 W/ 250 OVERRIDE (IP)

## 2021-09-03 PROCEDURE — 700101 HCHG RX REV CODE 250

## 2021-09-03 PROCEDURE — 82330 ASSAY OF CALCIUM: CPT

## 2021-09-03 PROCEDURE — 99153 MOD SED SAME PHYS/QHP EA: CPT

## 2021-09-03 PROCEDURE — 99152 MOD SED SAME PHYS/QHP 5/>YRS: CPT | Performed by: INTERNAL MEDICINE

## 2021-09-03 PROCEDURE — 85014 HEMATOCRIT: CPT | Mod: 91

## 2021-09-03 PROCEDURE — 160002 HCHG RECOVERY MINUTES (STAT)

## 2021-09-03 PROCEDURE — 93451 RIGHT HEART CATH: CPT | Mod: 26 | Performed by: INTERNAL MEDICINE

## 2021-09-03 RX ORDER — MIDAZOLAM HYDROCHLORIDE 1 MG/ML
INJECTION INTRAMUSCULAR; INTRAVENOUS
Status: COMPLETED
Start: 2021-09-03 | End: 2021-09-03

## 2021-09-03 RX ORDER — HEPARIN SODIUM 200 [USP'U]/100ML
INJECTION, SOLUTION INTRAVENOUS
Status: COMPLETED
Start: 2021-09-03 | End: 2021-09-03

## 2021-09-03 RX ORDER — HEPARIN SODIUM 1000 [USP'U]/ML
INJECTION, SOLUTION INTRAVENOUS; SUBCUTANEOUS
Status: COMPLETED
Start: 2021-09-03 | End: 2021-09-03

## 2021-09-03 RX ORDER — SODIUM CHLORIDE 9 MG/ML
INJECTION, SOLUTION INTRAVENOUS CONTINUOUS
Status: DISCONTINUED | OUTPATIENT
Start: 2021-09-03 | End: 2021-09-03 | Stop reason: HOSPADM

## 2021-09-03 RX ORDER — LIDOCAINE HYDROCHLORIDE 20 MG/ML
INJECTION, SOLUTION INFILTRATION; PERINEURAL
Status: COMPLETED
Start: 2021-09-03 | End: 2021-09-03

## 2021-09-03 RX ORDER — DOPAMINE HYDROCHLORIDE 160 MG/100ML
INJECTION, SOLUTION INTRAVENOUS
Status: DISCONTINUED
Start: 2021-09-03 | End: 2021-09-03 | Stop reason: HOSPADM

## 2021-09-03 RX ADMIN — LIDOCAINE HYDROCHLORIDE: 20 INJECTION, SOLUTION INFILTRATION; PERINEURAL at 09:00

## 2021-09-03 RX ADMIN — HEPARIN SODIUM: 1000 INJECTION, SOLUTION INTRAVENOUS; SUBCUTANEOUS at 09:00

## 2021-09-03 RX ADMIN — FENTANYL CITRATE 50 MCG: 50 INJECTION, SOLUTION INTRAMUSCULAR; INTRAVENOUS at 09:29

## 2021-09-03 RX ADMIN — MIDAZOLAM 1 MG: 1 INJECTION, SOLUTION INTRAMUSCULAR; INTRAVENOUS at 09:30

## 2021-09-03 RX ADMIN — SODIUM CHLORIDE: 9 INJECTION, SOLUTION INTRAVENOUS at 06:46

## 2021-09-03 RX ADMIN — HEPARIN SODIUM 2000 UNITS: 200 INJECTION, SOLUTION INTRAVENOUS at 09:00

## 2021-09-03 ASSESSMENT — FIBROSIS 4 INDEX: FIB4 SCORE: 1.138419957660616559

## 2021-09-03 NOTE — DISCHARGE INSTRUCTIONS
ACTIVITY: Rest and take it easy for the first 24 hours.  A responsible adult is recommended to remain with you during that time.  It is normal to feel sleepy.  We encourage you to not do anything that requires balance, judgment or coordination.    MILD FLU-LIKE SYMPTOMS ARE NORMAL. YOU MAY EXPERIENCE GENERALIZED MUSCLE ACHES, THROAT IRRITATION, HEADACHE AND/OR SOME NAUSEA.    FOR 24 HOURS DO NOT:  Drive, operate machinery or run household appliances.  Drink beer or alcoholic beverages.   Make important decisions or sign legal documents.    SPECIAL INSTRUCTIONS:    POST ANGIOGRAM  General Care Instructions  • Maintain a bandage over the incision site for 24 hours.  It's normal to find a small bruise or dime-sized lump at the insertion site. This should disappear within a few weeks.  • Do not apply lotions or powders to the site.  Do not immerse the catheter insertion site in water (bathtub/swimming) for five days. It is ok to shower 24 hours after the procedure.  • You may resume your normal diet immediately; on the day of your procedure, drink 6-10 glasses of water to help flush the contrast liquid out of your system.  • If the doctor inserted the catheter in through your groin:  o Walking short distances on a flat surface is OK. Limit going up/down stairs for the first 2 days.  o DO NOT do yard work, drive, squat, lift heavy objects, or play sports for 2 days; or until your health care provider tells you it is OK.  • If the doctor inserted the catheter in your arm:  o For 24 hours, DO NOT lift anything heavier than 10 pounds (approximately a gallon of milk). DO NOT do any heavy pushing, pulling, or twisting.    Medications  • If your current medications need to be changed, you will be provided with an updated list of your medications prior to discharge.  • If you take warfarin (Coumadin), resume taking your usual dose the evening after the procedure.  • DO NOT STOP taking prescribed blood thinning (anti-platelet)  medications unless instructed by your cardiologist.  These medications include:  o Aspirin, Clopidogrel (Plavix), Ticagrelor (Brilinta), or Prasugrel (Effient)   • If you take one of the following anticoagulants, RESUME 24 HOURS after your procedure:  o Apixiban (Eliquis), Rivaroxaban (Xarelto), Dabigatran (Pradaxa), Edoxaban (Savaysa)  • If you take metformin (Glucophage), RESUME 48 HOURS after your procedure.    When to call your healthcare provider  Call your cardiologist right away at [phone] if you have any of the following:  ?  Problems/Concerns taking any of your prescribed heart medicines.  ?  The insertion site has increasing pain, swelling, redness, bleeding, or drainage.  ?  Your arm or leg below where the insertion site changes color, is cool, or is numb.  ?  You have chest pain or shortness of breath that does not go away with rest.  ?  Your pulse feels irregular -- very slow (less than 60 beats/minute) or very fast (over 100 beats/minute).  ?  You have dizziness, fainting, or you are very tired.  ?  You are coughing up blood or yellow or green mucus.  ?  You have chills or a fever over 101°F (38.3°C).   ?  If there is bleeding at the catheter insertion site, apply pressure for 10 minutes.  If bleeding persists, call 911, and continue to hold pressure until advanced medical support arrives.        Exercising Safely After Percutaneous Coronary Intervention (PCI)  After percutaneous coronary intervention (PCI), which involves angioplasty and often stenting, it's important to focus on your heart health. Exercise can help strengthen your heart. It can also help you feel good and improve your overall health. Talk with your health care provider or cardiac rehab team member about good options for you.  • Start slowly. Work up to more vigorous exercise as you get stronger. Aim for at least 150 minutes of exercise each week.  • Include aerobic activities. These make the heart beat faster. They work the  heart and lungs, and improve the body's ability to use oxygen. Good choices include walking, swimming, and biking .  Always follow your doctor's recommendation for exercise.   You have been referred to cardiac rehabilitation, which is important for your recovery.  You may contact [facility] Intensive [facility] at [phone] to learn more and schedule a visit.        Lifestyle Management After Percutaneous Coronary Intervention (PCI)  Percutaneous coronary intervention (PCI)  involves angioplasty and often stenting. This procedure can open arteries and relieve symptoms. But, it doesn't cure coronary artery disease. New blockages can still form. You need to take steps to prevent this by managing risk factors. Doing so will help make your heart and arteries healthier. Your doctor may prescribe cardiac rehabilitation to help with this lifelong process.  Understanding risk factors  Some risk factors for coronary artery disease can be controlled. These include smoking, high blood pressure, cholesterol, diabetes, and obesity. They can be managed with medication, diet, and exercise. Support and counseling can also play a role. The effort will pay off! Managing risk factors can help you be more active, feel better, and reduce the risk of heart attack.    If you smoke, quit!  If your doctor has been urging you to quit smoking, it's for good reasons. Smoking damages your heart, blood vessels, and lungs. The good news is that quitting can halt or even reverse the damage of smoking. To quit now:  • Get medical help. Ask your doctor for advice on stop-smoking programs. Also ask about medications or nicotine replacement therapy products that may help you quit smoking.  • Get support. Join a support group. Ask for help from your family and friends.  • Don't give up. It often takes several tries to succeed in quitting smoking.  • Avoid secondhand smoke. Ask family and friends not to smoke around you.    DIET: To avoid  nausea, slowly advance diet as tolerated, avoiding spicy or greasy foods for the first day.  Add more substantial food to your diet according to your physician's instructions.  Babies can be fed formula or breast milk as soon as they are hungry.  INCREASE FLUIDS AND FIBER TO AVOID CONSTIPATION.    SURGICAL DRESSING/BATHING:   Keep dressing on for 24 hours. Watch for swelling, drainage, and edema. Apply pressure if bleeding occurs. May remove dressing on 9/4/21 at 11am.    FOLLOW-UP APPOINTMENT:  A follow-up appointment should be arranged with your doctor in 1-2 weeks; call to schedule.    You should CALL YOUR PHYSICIAN if you develop:  Fever greater than 101 degrees F.  Pain not relieved by medication, or persistent nausea or vomiting.  Excessive bleeding (blood soaking through dressing) or unexpected drainage from the wound.  Extreme redness or swelling around the incision site, drainage of pus or foul smelling drainage.  Inability to urinate or empty your bladder within 8 hours.  Problems with breathing or chest pain.    You should call 911 if you develop problems with breathing or chest pain.  If you are unable to contact your doctor or surgical center, you should go to the nearest emergency room or urgent care center.  Physician's telephone #: [phone]    If any questions arise, call your doctor.  If your doctor is not available, please feel free to call the Surgical Center at [phone]. The Contact Center is open Monday through Friday 7AM to 5PM and may speak to a nurse at [phone], or toll free at [phone].     A registered nurse may call you a few days after your surgery to see how you are doing after your procedure.    MEDICATIONS: Resume taking daily medication.  Take prescribed pain medication with food.  If no medication is prescribed, you may take non-aspirin pain medication if needed.  PAIN MEDICATION CAN BE VERY CONSTIPATING.  Take a stool softener or laxative such as senokot,  pericolace, or milk of magnesia if needed.    PIf your physician has prescribed pain medication that includes Acetaminophen (Tylenol), do not take additional Acetaminophen (Tylenol) while taking the prescribed medication.    Depression / Suicide Risk    As you are discharged from this [facility] facility, it is important to learn how to keep safe from harming yourself.    Recognize the warning signs:  · Abrupt changes in personality, positive or negative- including increase in energy   · Giving away possessions  · Change in eating patterns- significant weight changes-  positive or negative  · Change in sleeping patterns- unable to sleep or sleeping all the time   · Unwillingness or inability to communicate  · Depression  · Unusual sadness, discouragement and loneliness  · Talk of wanting to die  · Neglect of personal appearance   · Rebelliousness- reckless behavior  · Withdrawal from people/activities they love  · Confusion- inability to concentrate     If you or a loved one observes any of these behaviors or has concerns about self-harm, here's what you can do:  · Talk about it- your feelings and reasons for harming yourself  · Remove any means that you might use to hurt yourself (examples: pills, rope, extension cords, firearm)  · Get professional help from the community (Mental Health, Substance Abuse, psychological counseling)  · Do not be alone:Call your Safe Contact- someone whom you trust who will be there for you.  · Call your local CRISIS HOTLINE 861-0099 or 580-405-0095  · Call your local Children's Mobile Crisis Response Team [place] (669) 753-9408 or www.[employer]  · Call the toll free National Suicide Prevention Hotlines   · National Suicide Prevention Lifeline 075-397-BRGG (0965)  · National Hope Line Network 800-SUICIDE (793-2712)

## 2021-09-03 NOTE — PROCEDURES
PREOPERATIVE DIAGNOSIS:  1.  Exertional hypoxia  2.  PFO  3.  Clubbing    POSTOPERATIVE DIAGNOSIS:  1.  Normal intracardiac pressures and cardiac output      PROCEDURE PERFORMED:  Right heart catheterization  Shunt Run  Supervision moderate sedation    DESCRIPTION OF PROCEDURE:  The risks and benefits of cardiac catheterization as well as the procedure itself, rationale and appropriateness were discussed with the patient today. Complications including but not limited to death, stroke, MI, urgent bypass surgery, contrast nephropathy, vascular complications, bleeding and infection were explained to the patient. The potential outcomes associated with the procedure (possible PCI, possible CABG, possible medical Rx only) were also discussed at length. The patient agreed to proceed.    The patient was transported to the catheterization laboratory in the fasting state.  Right brachial area is anesthetized for prepped and draped in usual fashion and exchanged through a pre-existing brachial IV for a 6 [place] glide sheath.  Through that sheath Sutherlin-Zohaib catheter was passed routine hemodynamics and saturations were obtained catheters removed and pressure was applied to achieve adequate hemostasis.  Patient left catheter septation.      Moderate sedation directly monitored by me during the case while supervising the administration of the sedation medication by an independent trained RN to assist in the monitoring of the patient's level of consciousness and physiological status. I, the supervising physician was present the entire time from beginning of medication administration until the end of the procedure from 8:49 AM until 9:25 AM. For detailed administration records please see the moderate sedation documentation in the median tab.      FINDINGS:  I. HEMODYNAMICS:   PA 25/11/17  PCWP 11/9/9  RV 27/2/9  RA 8/6/5  TD CO 5.45, TD CI 2.54  Gladys CO 4.70, Gladys CI 2.20  Systemic saturation: 97%  PA sat 72%  High RA sat 78%  Mid RA  sat 74%  Low RA sat 75%

## 2021-09-06 LAB
BASE EXCESS BLDV CALC-SCNC: -1 MMOL/L (ref -4–3)
BASE EXCESS BLDV CALC-SCNC: -1 MMOL/L (ref -4–3)
BODY TEMPERATURE: ABNORMAL DEGREES
BODY TEMPERATURE: ABNORMAL DEGREES
CA-I BLD ISE-SCNC: 1.2 MMOL/L (ref 1.1–1.3)
CA-I BLD ISE-SCNC: 1.21 MMOL/L (ref 1.1–1.3)
CO2 BLDV-SCNC: 26 MMOL/L (ref 20–33)
CO2 BLDV-SCNC: 26 MMOL/L (ref 20–33)
HCO3 BLDV-SCNC: 25 MMOL/L (ref 24–28)
HCO3 BLDV-SCNC: 25 MMOL/L (ref 24–28)
HCT VFR BLD CALC: 46 % (ref 42–52)
HCT VFR BLD CALC: 46 % (ref 42–52)
HGB BLD-MCNC: 15.6 G/DL (ref 14–18)
HGB BLD-MCNC: 15.6 G/DL (ref 14–18)
PCO2 BLDV: 44.2 MMHG (ref 41–51)
PCO2 BLDV: 44.2 MMHG (ref 41–51)
PH BLDV: 7.36 [PH] (ref 7.31–7.45)
PH BLDV: 7.36 [PH] (ref 7.31–7.45)
PO2 BLDV: 41 MMHG (ref 25–40)
PO2 BLDV: 44 MMHG (ref 25–40)
POTASSIUM BLD-SCNC: 4 MMOL/L (ref 3.6–5.5)
POTASSIUM BLD-SCNC: 4.1 MMOL/L (ref 3.6–5.5)
SAO2 % BLDV: 74 %
SAO2 % BLDV: 78 %
SODIUM BLD-SCNC: 141 MMOL/L (ref 135–145)
SODIUM BLD-SCNC: 142 MMOL/L (ref 135–145)
SPECIMEN DRAWN FROM PATIENT: ABNORMAL
SPECIMEN DRAWN FROM PATIENT: ABNORMAL

## 2021-09-07 ENCOUNTER — PATIENT MESSAGE (OUTPATIENT)
Dept: CARDIOLOGY | Facility: MEDICAL CENTER | Age: 56
End: 2021-09-07

## 2021-09-07 DIAGNOSIS — Q21.12 PFO (PATENT FORAMEN OVALE): ICD-10-CM

## 2021-09-08 NOTE — PATIENT COMMUNICATION
Antonio Downing M.D.  You 2 hours ago (12:36 PM)     Please let him know sending all of his information to my partner who would perform a closure to discuss.  I do think it would be reasonable to close his PFO but would like to discuss it with my partner first.     We will let him know.       You  Antonio Downing M.D. Yesterday (9:30 AM)     Pt had right heart cath on 09/03, please advise. Thank you!        MyChart response sent to pt.

## 2021-09-15 NOTE — PATIENT COMMUNICATION
Antonio Downing M.D.  You 18 hours ago (2:12 PM)     Please set him up with a consult with Dr. Chapin for PFO closure.     Thanks     TW     Referral order placed, structural heart team notified. MyChart response sent to pt.

## 2021-09-21 ENCOUNTER — TELEPHONE (OUTPATIENT)
Dept: CARDIOLOGY | Facility: MEDICAL CENTER | Age: 56
End: 2021-09-21

## 2021-09-21 NOTE — TELEPHONE ENCOUNTER
Spoke with patient regarding [facility] referral placed by Dr. Downing for PFO.     Patient provides multiple dates he will be unavailable for consult due to planned vacations.   Reviewed availability including date, time, location for such. Patient repeats back information accurately and agrees with plan. Assured patient that apt details available via [employer] as well. Per patient request, assured patient has also been placed on wait list for PFO consultation.   Patient states no questions at this time and very thankful for call.

## 2021-10-27 ENCOUNTER — OCCUPATIONAL MEDICINE (OUTPATIENT)
Dept: OCCUPATIONAL MEDICINE | Facility: OTHER | Age: 56
End: 2021-10-27
Payer: COMMERCIAL

## 2021-10-27 VITALS
HEART RATE: 74 BPM | OXYGEN SATURATION: 95 % | TEMPERATURE: 97.2 F | HEIGHT: 70 IN | BODY MASS INDEX: 30.06 KG/M2 | WEIGHT: 210 LBS | DIASTOLIC BLOOD PRESSURE: 90 MMHG | SYSTOLIC BLOOD PRESSURE: 140 MMHG | RESPIRATION RATE: 16 BRPM

## 2021-10-27 DIAGNOSIS — R09.02 HYPOXIA: ICD-10-CM

## 2021-10-27 DIAGNOSIS — Q21.12 PFO (PATENT FORAMEN OVALE): ICD-10-CM

## 2021-10-27 PROCEDURE — 99213 OFFICE O/P EST LOW 20 MIN: CPT | Performed by: FAMILY MEDICINE

## 2021-10-27 ASSESSMENT — FIBROSIS 4 INDEX: FIB4 SCORE: 1.159501808728405755

## 2021-10-27 ASSESSMENT — ENCOUNTER SYMPTOMS
SHORTNESS OF BREATH: 1
COUGH: 0
FEVER: 0

## 2021-10-27 NOTE — LETTER
10/27/2021    Patient: Jason Ray  : 1965  Provider: Ramesh Bejarano M.D.      RETURN TO WORK    BODY PART: HEART  EMPLOYER:[employer]      It is the inured worker's responsibility to inform the employer of current work status.    CURRENT RESTRICTIONS: LIGHT DUTY    LIMIT EXTREME EXERTION       CONDITION STABLE: NO  CONDITION RATABLE: NO    RETURN VISIT: Return in about 4 weeks (around 2021).    Electronically Signed: Ramesh Bejarano M.D.

## 2021-10-27 NOTE — PROGRESS NOTES
" Subjective:   Jason Ray is a 56 y.o. male here for the evaluation and management of Follow-Up (HEART )    Exertional dyspnea with associated hypoxia and evidence of a patent foramen ovale pending cardiology consultation.  He reports persistent exertional symptoms with elevation of his heart rate.  He continues to work through the administrative process regarding covering his heart related issues under his work insurance.    We discussed his ongoing symptoms and future directions including consideration for pulmonary evaluation and a second opinion  No problems updated.    Review of Systems   Constitutional: Negative for fever.   Respiratory: Positive for shortness of breath. Negative for cough.        Current Outpatient Medications   Medication Sig Dispense Refill   • TESTOSTERONE CYPIONATE INJ Inject 140 mg as directed every 7 days.     • MAGNESIUM PO Take 2 Tablets by mouth every day.     • anastrozole (ARIMIDEX) 1 MG Tab Take 1 mg by mouth every 7 days. Indications: estrogen blocker       No current facility-administered medications for this visit.     Allergies  Patient has no known allergies.    Past Medical History:   Diagnosis Date   • Breath shortness    • Oxygen desaturation     with exercise     Patient Active Problem List    Diagnosis Date Noted   • PFO (patent foramen ovale) 04/28/2021   • Hypoxia 04/28/2021   • Long-term current use of testosterone replacement therapy 04/28/2021       Past Surgical History  No past surgical history on file.     Objective:     Vitals:    10/27/21 0938   BP: 140/90   BP Location: Right arm   Patient Position: Sitting   BP Cuff Size: Adult   Pulse: 74   Resp: 16   Temp: 36.2 °C (97.2 °F)   TempSrc: Temporal   SpO2: 95%   Weight: 95.3 kg (210 lb)   Height: 1.778 m (5' 10\")     Body mass index is 30.13 kg/m².     Physical Exam  Constitutional:       Appearance: Normal appearance.   HENT:      Head: Normocephalic.   Eyes:      Extraocular Movements: Extraocular " movements intact.      Conjunctiva/sclera: Conjunctivae normal.   Cardiovascular:      Rate and Rhythm: Normal rate and regular rhythm.      Heart sounds: Normal heart sounds.   Pulmonary:      Effort: Pulmonary effort is normal.      Breath sounds: Normal breath sounds.   Skin:     General: Skin is warm and dry.   Neurological:      Mental Status: He is alert and oriented to person, place, and time. Mental status is at baseline.   Psychiatric:         Mood and Affect: Mood normal.         Behavior: Behavior normal.     Clubbing observed of all fingers    Assessment and Plan:   Jason Ray is a 56 y.o. male with a Follow-Up (HEART )     The following was discussed with the patient today.    Problem List Items Addressed This Visit     None      Pending cardiology consultation in approximately 2-1/2 weeks, discussed a second pulmonary opinion and referral provided, I have a strong suspicion his patent foramen ovale is causing exertional hypoxia and associated clubbing in his fingers and would support consultation with cardiology about definitive treatment    Followup: No follow-ups on file.    Ramesh Bejarano M.D.    Please note that this dictation was created using voice recognition software. I have made every reasonable attempt to correct obvious errors, but I expect that there are errors of grammar and possibly content that I did not discover before finalizing the note.

## 2021-11-16 ENCOUNTER — TELEPHONE (OUTPATIENT)
Dept: CARDIOLOGY | Facility: MEDICAL CENTER | Age: 56
End: 2021-11-16

## 2021-11-16 ENCOUNTER — OFFICE VISIT (OUTPATIENT)
Dept: CARDIOLOGY | Facility: MEDICAL CENTER | Age: 56
End: 2021-11-16
Payer: COMMERCIAL

## 2021-11-16 ENCOUNTER — HOSPITAL ENCOUNTER (OUTPATIENT)
Facility: MEDICAL CENTER | Age: 56
End: 2021-11-16
Attending: INTERNAL MEDICINE | Admitting: INTERNAL MEDICINE
Payer: COMMERCIAL

## 2021-11-16 VITALS
RESPIRATION RATE: 14 BRPM | WEIGHT: 217 LBS | BODY MASS INDEX: 31.07 KG/M2 | DIASTOLIC BLOOD PRESSURE: 86 MMHG | HEART RATE: 86 BPM | OXYGEN SATURATION: 96 % | HEIGHT: 70 IN | SYSTOLIC BLOOD PRESSURE: 126 MMHG

## 2021-11-16 DIAGNOSIS — Q21.12 PFO WITH ATRIAL SEPTAL ANEURYSM: ICD-10-CM

## 2021-11-16 DIAGNOSIS — I25.3 PFO WITH ATRIAL SEPTAL ANEURYSM: ICD-10-CM

## 2021-11-16 PROCEDURE — 99215 OFFICE O/P EST HI 40 MIN: CPT | Performed by: INTERNAL MEDICINE

## 2021-11-16 ASSESSMENT — FIBROSIS 4 INDEX: FIB4 SCORE: 1.159501808728405755

## 2021-11-16 NOTE — TELEPHONE ENCOUNTER
----- Message from Yuval Chapin M.D. sent at 11/16/2021  9:41 AM PST -----  Schedule PFO closure.  He would like to do it in couple months.  We will use [place] device.  Inform Raya

## 2021-11-16 NOTE — TELEPHONE ENCOUNTER
Patient is scheduled on 2-24-22 for a PFO closure with Dr. Chapin. No meds to stop and pateint to check in at 6:00 for a 7:30 procedure. Updated H&P to be done on admit by NP. Pre admit to call patient. Stephanie PALMER/LOUISE Gamez notified by email on 11-16-21. Terell LONGO notified by phone on 11-16-21.

## 2021-11-16 NOTE — PROGRESS NOTES
"    Structural heart initial Consultation Note    Date of note:    11/16/2021    Primary Care Provider: ÁLVARO Ballesteros  Referring Provider: No ref. provider found     Patient Name: Jason Ray   YOB: 1965  MRN:              [id number]    Chief Complaint: Patent foramen ovale with exertional hypoxia    Jason Ray is a 56 y.o. male  patient referred by Dr. Downing for patent foramen ovale, exertional hypoxia.  He does yearly physical with cardiopulmonary stress test, a year ago he was hypoxic at peak.  Since then he has been noticing significant dyspnea with exertion, his oxygen saturation drops with heavy exertion, he also noticed clubbing in his fingers and toes.  He had extensive work-up including transthoracic echocardiogram, transesophageal echocardiogram, pulmonary function test, high-resolution CT scan.  He has mild restrictive lung disease but pulmonary notes reviewed, does not explain his hypoxia.  He also had a right heart catheterization.      Past Medical History:   Diagnosis Date   • Breath shortness    • Oxygen desaturation     with exercise         History reviewed. No pertinent surgical history.      Current Outpatient Medications   Medication Sig Dispense Refill   • TESTOSTERONE CYPIONATE INJ Inject 140 mg as directed every 7 days.     • MAGNESIUM PO Take 2 Tablets by mouth every day.     • anastrozole (ARIMIDEX) 1 MG Tab Take 1 mg by mouth every 7 days. Indications: estrogen blocker       No current facility-administered medications for this visit.         No Known Allergies      Physical Exam:  Ambulatory Vitals  /86 (BP Location: Left arm, Patient Position: Sitting, BP Cuff Size: Large adult)   Pulse 86   Resp 14   Ht 1.778 m (5' 10\")   Wt 98.4 kg (217 lb)   SpO2 96%    Oxygen Therapy:  Pulse Oximetry: 96 %  BP Readings from Last 4 Encounters:   11/16/21 126/86   10/27/21 140/90   09/03/21 133/87   08/13/21 140/88       Weight/BMI: Body mass " index is 31.14 kg/m².  Wt Readings from Last 4 Encounters:   11/16/21 98.4 kg (217 lb)   10/27/21 95.3 kg (210 lb)   09/03/21 96.4 kg (212 lb 8.4 oz)   08/13/21 97.1 kg (214 lb)       General: Well appearing and in no apparent distress  Head: atrumatic  Eyes: No conjunctival pallor   ENT: normal external appearance of nose and ears  Neck: JVD absent, carotid bruits absent  Lungs: respiratory sounds  normal, additional breath sounds absent  Heart: Regular rhythm,   No palpable thrills on palpation, murmurs absent, no rubs,   Lower extremity edema absent.   Pedal pulses normal  Abdomen: soft, non tender, non distended.  Extremities/MSK: no clubbing, no cyanosis  Neurological: normal orientation, Gait normal   Psychiatric: Appropriate affect, intact judgement and insight  Skin: Warm extremities        Lab Data Review:  Lab Results   Component Value Date/Time    CHOLSTRLTOT 231 (H) 08/20/2021 09:34 AM     (H) 08/20/2021 09:34 AM    HDL 51 08/20/2021 09:34 AM    TRIGLYCERIDE 142 08/20/2021 09:34 AM       Lab Results   Component Value Date/Time    SODIUM 137 09/01/2021 04:22 PM    POTASSIUM 4.4 09/01/2021 04:22 PM    CHLORIDE 101 09/01/2021 04:22 PM    CO2 23 09/01/2021 04:22 PM    GLUCOSE 95 09/01/2021 04:22 PM    BUN 20 09/01/2021 04:22 PM    CREATININE 1.08 09/01/2021 04:22 PM     Lab Results   Component Value Date/Time    ALKPHOSPHAT 45 09/01/2021 04:22 PM    ASTSGOT 22 09/01/2021 04:22 PM    ALTSGPT 40 09/01/2021 04:22 PM    TBILIRUBIN 0.7 09/01/2021 04:22 PM      Lab Results   Component Value Date/Time    WBC 6.1 09/01/2021 04:22 PM     Right heart catheterization 9/3/2021  FINDINGS:  I. HEMODYNAMICS:   PA 25/11/17  PCWP 11/9/9  RV 27/2/9  RA 8/6/5  TD CO 5.45, TD CI 2.54  Gladys CO 4.70, Gladys CI 2.20  Systemic saturation: 97%  PA sat 72%  High RA sat 78%  Mid RA sat 74%  Low RA sat 75%    Seba Dalkai's record, PFTs, pulmonary notes reviewed.  Transesophageal echocardiogram, transthoracic echocardiogram images  personally reviewed.      Medical Decision Makin-year-old male patient with exertional hypoxia, mild restrictive lung disease, structural heart disease with patent foramen ovale, atrial septal aneurysm.  I personally reviewed his echocardiogram, he has structural heart disease with patent PFO, atrial septal aneurysm with large right to left shunt with Valsalva.  I suspect his right heart pressures increase with exertion due to his mild restrictive lung disease which makes his shunt worse  leading to hypoxemia and dyspnea on exertion.  I do recommend PFO closure.    Risks and benefits are discussed in detail.  Particularly discussed very rare late risk of erosion.  Patient understands, agrees to proceed with PFO closure.    This note was dictated using Dragon speech recognition software.    Yuval TAPIA  Interventional cardiologist  [facility], [facility]  [address] [address]  Phone: [phone]  Fax: [phone]

## 2021-11-16 NOTE — TELEPHONE ENCOUNTER
CADEN Alaniz has order a :    CL-PATENT FORAMEN OVALE CLOSURE      Pt was informed he would receive a call to schedule.    Thank you!

## 2021-11-17 ENCOUNTER — OCCUPATIONAL MEDICINE (OUTPATIENT)
Dept: OCCUPATIONAL MEDICINE | Facility: OTHER | Age: 56
End: 2021-11-17
Payer: COMMERCIAL

## 2021-11-17 VITALS
OXYGEN SATURATION: 96 % | WEIGHT: 215 LBS | HEIGHT: 70 IN | SYSTOLIC BLOOD PRESSURE: 140 MMHG | HEART RATE: 78 BPM | DIASTOLIC BLOOD PRESSURE: 80 MMHG | TEMPERATURE: 97.5 F | BODY MASS INDEX: 30.78 KG/M2 | RESPIRATION RATE: 15 BRPM

## 2021-11-17 DIAGNOSIS — R09.02 HYPOXIA: ICD-10-CM

## 2021-11-17 DIAGNOSIS — Q21.12 PFO (PATENT FORAMEN OVALE): ICD-10-CM

## 2021-11-17 PROCEDURE — 99213 OFFICE O/P EST LOW 20 MIN: CPT | Performed by: FAMILY MEDICINE

## 2021-11-17 ASSESSMENT — FIBROSIS 4 INDEX: FIB4 SCORE: 1.159501808728405755

## 2021-11-17 ASSESSMENT — ENCOUNTER SYMPTOMS: SHORTNESS OF BREATH: 1

## 2021-11-17 NOTE — PROGRESS NOTES
" Subjective:   Jason Ray is a 56 y.o. male here for the evaluation and management of Follow-Up (w/c hypoxia)    Patent foramen ovale with associated right to left shunt causing hypoxia with exertion-reviewed and discussed previous cardiology evaluation recommended duration for PFO closure.  Patient reports symptoms have been unchanged.    No problems updated.    Review of Systems   Respiratory: Positive for shortness of breath.       Objective:     Vitals:    11/17/21 0946   BP: 140/80   BP Location: Left arm   Patient Position: Sitting   BP Cuff Size: Adult   Pulse: 78   Resp: 15   Temp: 36.4 °C (97.5 °F)   TempSrc: Temporal   SpO2: 96%   Weight: 97.5 kg (215 lb)   Height: 1.778 m (5' 10\")     Body mass index is 30.85 kg/m².     Physical Exam  Constitutional:       Appearance: Normal appearance.   HENT:      Head: Normocephalic.   Eyes:      Extraocular Movements: Extraocular movements intact.      Conjunctiva/sclera: Conjunctivae normal.   Neurological:      Mental Status: He is alert. Mental status is at baseline.   Psychiatric:         Mood and Affect: Mood normal.         Behavior: Behavior normal.         Assessment and Plan:   Jason Ray is a 56 y.o. male with a Follow-Up (w/c hypoxia)     Extensive review of previous medical records including pulmonary and cardiac evaluations, reviewed and discussed recent interventional cardiology evaluation with diagnosis of structural heart disease and recommendation for PFO closure.  From my standpoint, a symptomatic PFO with associated right to left shunt is the most obvious clinical explanation for exertional hypoxia and observe clubbing on physical examination.  I support the treatment plan per PFO closure.    Patient asked for my opinion regarding heart disease and I do think he has structural heart disease.  Problem List Items Addressed This Visit     PFO (patent foramen ovale)    Hypoxia          Followup: Return in about 6 weeks (around " 12/29/2021).    Ramesh Bejarano M.D.    Please note that this dictation was created using voice recognition software. I have made every reasonable attempt to correct obvious errors, but I expect that there are errors of grammar and possibly content that I did not discover before finalizing the note.

## 2021-11-17 NOTE — LETTER
2021    Patient: Jason Ray  : 1965  Provider: Ramesh Bejarano M.D.      RETURN TO WORK    BODY PART: HEART  EMPLOYER:[employer]      It is the inured worker's responsibility to inform the employer of current work status.    CURRENT RESTRICTIONS: LIGHT DUTY    LIMIT EXTREME EXERTION       CONDITION STABLE: NO  CONDITION RATABLE: NO    RETURN VISIT: Return in about 6 weeks (around 2021).    Electronically Signed: Ramesh Bejarano M.D.

## 2022-01-03 ENCOUNTER — APPOINTMENT (OUTPATIENT)
Dept: MEDICAL GROUP | Facility: OTHER | Age: 57
End: 2022-01-03
Payer: COMMERCIAL

## 2022-01-10 ENCOUNTER — OFFICE VISIT (OUTPATIENT)
Dept: MEDICAL GROUP | Facility: OTHER | Age: 57
End: 2022-01-10
Payer: COMMERCIAL

## 2022-01-10 VITALS
WEIGHT: 214.8 LBS | OXYGEN SATURATION: 95 % | TEMPERATURE: 97.8 F | DIASTOLIC BLOOD PRESSURE: 84 MMHG | HEART RATE: 86 BPM | HEIGHT: 70 IN | BODY MASS INDEX: 30.75 KG/M2 | SYSTOLIC BLOOD PRESSURE: 124 MMHG

## 2022-01-10 DIAGNOSIS — Q21.12 PFO (PATENT FORAMEN OVALE): ICD-10-CM

## 2022-01-10 DIAGNOSIS — R09.02 HYPOXIA: ICD-10-CM

## 2022-01-10 PROCEDURE — 99213 OFFICE O/P EST LOW 20 MIN: CPT | Performed by: FAMILY MEDICINE

## 2022-01-10 RX ORDER — TESTOSTERONE CYPIONATE 200 MG/ML
INJECTION, SOLUTION INTRAMUSCULAR
COMMUNITY
Start: 2021-11-22 | End: 2022-01-25

## 2022-01-10 ASSESSMENT — FIBROSIS 4 INDEX: FIB4 SCORE: 1.159501808728405755

## 2022-01-10 ASSESSMENT — ENCOUNTER SYMPTOMS: CONSTITUTIONAL NEGATIVE: 1

## 2022-01-10 NOTE — PROGRESS NOTES
" Subjective:   Jason Ray is a 56 y.o. male here for the evaluation and management of Follow-Up (Workers Comp Concerns)    Exertional hypoxia with mild restrictive lung disease and patent foramen ovale-patient reports unchanged symptoms with exertional hypoxia with cardiovascular exercise.  Reviewed and discussed recent cardiology evaluation with recommendation for patent foramen ovale closure with trisupport.  He reports administrative challenges  No problems updated.    Review of Systems   Constitutional: Negative.       Objective:     Vitals:    01/10/22 0944   BP: 124/84   Pulse: 86   Temp: 36.6 °C (97.8 °F)   SpO2: 95%   Weight: 97.4 kg (214 lb 12.8 oz)   Height: 1.778 m (5' 10\")     Body mass index is 30.82 kg/m².     Physical Exam  Constitutional:       Appearance: Normal appearance.   HENT:      Head: Normocephalic.   Eyes:      Extraocular Movements: Extraocular movements intact.      Conjunctiva/sclera: Conjunctivae normal.   Cardiovascular:      Rate and Rhythm: Normal rate and regular rhythm.      Heart sounds: Normal heart sounds.   Pulmonary:      Effort: Pulmonary effort is normal.      Breath sounds: Normal breath sounds.   Skin:     General: Skin is warm and dry.   Neurological:      Mental Status: He is alert and oriented to person, place, and time. Mental status is at baseline.   Psychiatric:         Mood and Affect: Mood normal.         Behavior: Behavior normal.         Assessment and Plan:   Jason Ray is a 56 y.o. male with a Follow-Up (Workers Comp Concerns)     Quote  From cardiology:  \"I suspect his right heart pressures increase with exertion due to his mild restrictive lung disease which makes his shunt worse  leading to hypoxemia and dyspnea on exertion. I do recommend PFO closure.\"    I agree with the cardiologist.  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  He is " scheduled in February and I encouraged him to proceed.    Problem List Items Addressed This Visit     PFO (patent foramen ovale)    Hypoxia          Followup: No follow-ups on file.    Ramesh Bejarano M.D.    Please note that this dictation was created using voice recognition software. I have made every reasonable attempt to correct obvious errors, but I expect that there are errors of grammar and possibly content that I did not discover before finalizing the note.

## 2022-01-10 NOTE — LETTER
1/10/2022    Patient: Jason Ray  : 1965  Provider: Ramesh Bejarano M.D.    RETURN TO WORK    BODY PART: HEART  EMPLOYER:[employer]    It is the inured worker's responsibility to inform the employer of current work status.    CURRENT RESTRICTIONS: LIGHT DUTY    LIMIT EXTREME EXERTION       CONDITION STABLE: NO  CONDITION RATABLE: NO     CONDITION STABLE: NO  CONDITION RATABLE: NO    RETURN VISIT: No follow-ups on file.    Electronically Signed: Ramesh Bejarano M.D.

## 2022-01-25 ENCOUNTER — OFFICE VISIT (OUTPATIENT)
Dept: CARDIOLOGY | Facility: MEDICAL CENTER | Age: 57
End: 2022-01-25
Payer: COMMERCIAL

## 2022-01-25 VITALS
RESPIRATION RATE: 14 BRPM | HEIGHT: 70 IN | BODY MASS INDEX: 30.78 KG/M2 | WEIGHT: 215 LBS | OXYGEN SATURATION: 97 % | DIASTOLIC BLOOD PRESSURE: 94 MMHG | SYSTOLIC BLOOD PRESSURE: 164 MMHG | HEART RATE: 70 BPM

## 2022-01-25 DIAGNOSIS — Q21.12 PFO (PATENT FORAMEN OVALE): ICD-10-CM

## 2022-01-25 DIAGNOSIS — R06.02 SOBOE (SHORTNESS OF BREATH ON EXERTION): ICD-10-CM

## 2022-01-25 PROCEDURE — 99215 OFFICE O/P EST HI 40 MIN: CPT | Performed by: INTERNAL MEDICINE

## 2022-01-25 ASSESSMENT — ENCOUNTER SYMPTOMS
ABDOMINAL PAIN: 0
GASTROINTESTINAL NEGATIVE: 1
CLAUDICATION: 0
VOMITING: 0
NEUROLOGICAL NEGATIVE: 1
PSYCHIATRIC NEGATIVE: 1
NERVOUS/ANXIOUS: 0
CONSTITUTIONAL NEGATIVE: 1
DEPRESSION: 0
EYES NEGATIVE: 1
NAUSEA: 0
DOUBLE VISION: 0
FEVER: 0
COUGH: 0
CARDIOVASCULAR NEGATIVE: 1
MUSCULOSKELETAL NEGATIVE: 1
FOCAL WEAKNESS: 0
BLURRED VISION: 0
HEADACHES: 0
PALPITATIONS: 0
MYALGIAS: 0
WEAKNESS: 0
SHORTNESS OF BREATH: 1
DIZZINESS: 0
BRUISES/BLEEDS EASILY: 0
CHILLS: 0
WEIGHT LOSS: 0

## 2022-01-25 ASSESSMENT — FIBROSIS 4 INDEX: FIB4 SCORE: 1.159501808728405755

## 2022-01-25 NOTE — TELEPHONE ENCOUNTER
Patient cancelled PFO closure for 2-24-22 and will call back to reschedule once his workman's comp is all in place.

## 2022-01-25 NOTE — PROGRESS NOTES
Chief Complaint   Patient presents with   • Shortness of Breath     follow up/ prev. pt of Dr. CADEN Jaimes     Jason Ray is a 56 y.o. male who presents today for second opinion for symptomatic patent foramen ovale.    Since the patient's last visit on 11/16/21 for structural heart consult with Yuval Bynum, he has continued to experience shortness of breath with exertional shortness of breath. He denies fatigue, chest pain, dizziness or syncope. On the last visit with Dr. Chapin, the patient was recommended for patent foramen ovale closure due to his symptoms. Of note, we were classmates in college and he has worked as a . He was previously followed by Corey Segundo and Dr. Donis Mathew. He transferred to [facility] and was initially evaluated by Antonio Mina.    Past Medical History:   Diagnosis Date   • Breath shortness    • Oxygen desaturation     with exercise   • PFO (patent foramen ovale)      Past Surgical History:   Procedure Laterality Date   • ZZZ CARDIAC CATH       Family History   Problem Relation Age of Onset   • Heart Disease Neg Hx      Social History     Socioeconomic History   • Marital status:      Spouse name: Not on file   • Number of children: Not on file   • Years of education: Not on file   • Highest education level: Not on file   Occupational History   • Not on file   Tobacco Use   • Smoking status: Never Smoker   • Smokeless tobacco: Never Used   Vaping Use   • Vaping Use: Never used   Substance and Sexual Activity   • Alcohol use: Not Currently   • Drug use: Not Currently   • Sexual activity: Not on file   Other Topics Concern   • Not on file   Social History Narrative   • Not on file     Social Determinants of Health     Financial Resource Strain:    • Difficulty of Paying Living Expenses: Not on file   Food Insecurity:    • Worried About Running Out of Food in the Last Year: Not on file   • Ran Out of Food in  the Last Year: Not on file   Transportation Needs:    • Lack of Transportation (Medical): Not on file   • Lack of Transportation (Non-Medical): Not on file   Physical Activity:    • Days of Exercise per Week: Not on file   • Minutes of Exercise per Session: Not on file   Stress:    • Feeling of Stress : Not on file   Social Connections:    • Frequency of Communication with Friends and Family: Not on file   • Frequency of Social Gatherings with Friends and Family: Not on file   • Attends Yarsanism Services: Not on file   • Active Member of Clubs or Organizations: Not on file   • Attends Club or Organization Meetings: Not on file   • Marital Status: Not on file   Intimate Partner Violence:    • Fear of Current or Ex-Partner: Not on file   • Emotionally Abused: Not on file   • Physically Abused: Not on file   • Sexually Abused: Not on file   Housing Stability:    • Unable to Pay for Housing in the Last Year: Not on file   • Number of Places Lived in the Last Year: Not on file   • Unstable Housing in the Last Year: Not on file     No Known Allergies     (Medications reviewed.)  Outpatient Encounter Medications as of 1/25/2022   Medication Sig Dispense Refill   • TESTOSTERONE CYPIONATE INJ Inject 140 mg as directed every 7 days.     • anastrozole (ARIMIDEX) 1 MG Tab Take 1 mg by mouth every 7 days. Indications: estrogen blocker     • [DISCONTINUED] testosterone cypionate (DEPO-TESTOSTERONE) 200 MG/ML Solution injection      • [DISCONTINUED] MAGNESIUM PO Take 2 Tablets by mouth every day. (Patient not taking: Reported on 1/25/2022)       No facility-administered encounter medications on file as of 1/25/2022.     Review of Systems   Constitutional: Negative.  Negative for chills, fever, malaise/fatigue and weight loss.   HENT: Negative.  Negative for hearing loss.    Eyes: Negative.  Negative for blurred vision and double vision.   Respiratory: Positive for shortness of breath. Negative for cough.    Cardiovascular:  "Negative.  Negative for chest pain, palpitations, claudication and leg swelling.   Gastrointestinal: Negative.  Negative for abdominal pain, nausea and vomiting.   Genitourinary: Negative.  Negative for dysuria and urgency.   Musculoskeletal: Negative.  Negative for joint pain and myalgias.   Skin: Negative.  Negative for itching and rash.   Neurological: Negative.  Negative for dizziness, focal weakness, weakness and headaches.   Endo/Heme/Allergies: Negative.  Does not bruise/bleed easily.   Psychiatric/Behavioral: Negative.  Negative for depression. The patient is not nervous/anxious.               Objective     BP (!) 164/94 (BP Location: Left arm, Patient Position: Sitting)   Pulse 70   Resp 14   Ht 1.778 m (5' 10\")   Wt 97.5 kg (215 lb)   SpO2 97%   BMI 30.85 kg/m²     Physical Exam  Constitutional:       Appearance: He is well-developed.   HENT:      Head: Normocephalic and atraumatic.   Neck:      Vascular: No JVD.   Cardiovascular:      Rate and Rhythm: Normal rate and regular rhythm.      Heart sounds: Normal heart sounds.   Pulmonary:      Effort: Pulmonary effort is normal.      Breath sounds: Normal breath sounds.   Abdominal:      General: Bowel sounds are normal.      Palpations: Abdomen is soft.      Comments: No hepatosplenomegaly.   Musculoskeletal:         General: Normal range of motion.   Lymphadenopathy:      Cervical: No cervical adenopathy.   Skin:     General: Skin is warm and dry.   Neurological:      Mental Status: He is alert and oriented to person, place, and time.             CARDIAC STUDIES/PROCEDURES:    CARDIAC CATHETERIZATION CONCLUSIONS by Antonio Mina (09/03/21)  Normal right heart intracardiac pressures and cardiac output.  (study result reviewed)    CARDIAC MRI (07/02/21)  1.  Calculated left ventricular ejection fraction of 51%  2.  Calculated right ventricular ejection fraction of 52%  3.  Mildly enlarged atria  4.  No partial anomalous pulmonary venous return " appreciated on MRA  5.  No shunt is appreciated by volumetric analysis. Phase contrast in the volumetric analysis are discordant. Qp:Qs ratio of 0.55 could conceivably be related to inspiratory breath hold during scanning resulting in a left to right shunt as seen on prior echocardiogram.    ECHOCARDIOGRAM CONCLUSIONS at [facility]  (02/18/21)  1. The left ventricle is normal in size with normal systolic function. Ejection fraction of 60-65% by   visual estimation. Mild concentric left ventricular hypertrophy.   2. The right atrium is mildly dilated.   3. No significant valvular abnormalities.   4. Normal right ventricular size, wall thickness and function.   5. Saline bubble study performed with a few bubbles crossing the interatrial septum most consistent w   ith a small PFO.  (study result reviewed)    EKG performed on (09/01/21) was reviewed: EKG personally interpreted shows sinus bradycardia    Laboratory results of (08/20/21) were reviewed. Cholesterol profile of 231/142/51/152 mg/dL noted.    Assessment & Plan     1. PFO (patent foramen ovale)     2. SOBOE (shortness of breath on exertion)         Medical Decision Making: Today's Assessment/Status/Plan:        1. Patent foramen ovale: He has patent foramen ovale and is symptomatic with exertional hypoxemia and restrictive lung disease diagnosed by Donis Allred which is directly caused by his patent foramen ovale. In addition his patent foramen ovale is causing worsening symptoms related to his lung disease. I recommend he proceed with patent foramen ovale closure as recommended by Yuval Bynum. To clarify, the patinet has heart disease and patent foramen ovale is a disease of the heart. Of note, this procedure may not resolve his symptoms completely.   Greater than 45 minutes of time was spent to review all above information; of which more than 50% of the time was face to face reviewing thee patient's medical issues, medication  evaluation, study result review, lab results review.    The risks, benefits, and alternatives to PFO closure with IV sedation were discussed in great detail. Specific risks mentioned include bleeding, infection, kidney damage, allergic reaction, cardiac perforation with possible tamponade requiring sagrario-cardiocentesis or possible open heart surgery. In addition, we discussed that 10% of patients will experience small to moderate bruising at the side of the arterial puncture. Lastly the risks of heart attack, stroke, and death were discussed; the risks of major complications such as heart attack or stroke caused by the angiogram is less than 1%; the risk of death is approximately 1 in 1000. The patient verbalized understanding of these potential complications and wishes to proceed with this procedure.    We will follow up in 3 months.    CC Ramesh Sanchez

## 2022-02-24 ENCOUNTER — APPOINTMENT (OUTPATIENT)
Dept: CARDIOLOGY | Facility: MEDICAL CENTER | Age: 57
End: 2022-02-24
Attending: INTERNAL MEDICINE
Payer: COMMERCIAL

## 2022-03-16 ENCOUNTER — HOSPITAL ENCOUNTER (OUTPATIENT)
Dept: LAB | Facility: MEDICAL CENTER | Age: 57
End: 2022-03-16
Attending: FAMILY MEDICINE
Payer: COMMERCIAL

## 2022-03-16 LAB
ANION GAP SERPL CALC-SCNC: 12 MMOL/L (ref 7–16)
BUN SERPL-MCNC: 21 MG/DL (ref 8–22)
CALCIUM SERPL-MCNC: 9.4 MG/DL (ref 8.5–10.5)
CHLORIDE SERPL-SCNC: 105 MMOL/L (ref 96–112)
CHOLEST SERPL-MCNC: 240 MG/DL (ref 100–199)
CO2 SERPL-SCNC: 24 MMOL/L (ref 20–33)
CREAT SERPL-MCNC: 1.13 MG/DL (ref 0.5–1.4)
ERYTHROCYTE [DISTWIDTH] IN BLOOD BY AUTOMATED COUNT: 46.5 FL (ref 35.9–50)
ESTRADIOL SERPL-MCNC: 14.9 PG/ML
GFR SERPLBLD CREATININE-BSD FMLA CKD-EPI: 77 ML/MIN/1.73 M 2
GLUCOSE SERPL-MCNC: 83 MG/DL (ref 65–99)
HCT VFR BLD AUTO: 47.4 % (ref 42–52)
HDLC SERPL-MCNC: 59 MG/DL
HGB BLD-MCNC: 16.2 G/DL (ref 14–18)
LDLC SERPL CALC-MCNC: 140 MG/DL
MCH RBC QN AUTO: 32.7 PG (ref 27–33)
MCHC RBC AUTO-ENTMCNC: 34.2 G/DL (ref 33.7–35.3)
MCV RBC AUTO: 95.6 FL (ref 81.4–97.8)
PLATELET # BLD AUTO: 191 K/UL (ref 164–446)
PMV BLD AUTO: 11.2 FL (ref 9–12.9)
POTASSIUM SERPL-SCNC: 5.4 MMOL/L (ref 3.6–5.5)
RBC # BLD AUTO: 4.96 M/UL (ref 4.7–6.1)
SODIUM SERPL-SCNC: 141 MMOL/L (ref 135–145)
TRIGL SERPL-MCNC: 205 MG/DL (ref 0–149)
WBC # BLD AUTO: 6.7 K/UL (ref 4.8–10.8)

## 2022-03-16 PROCEDURE — 36415 COLL VENOUS BLD VENIPUNCTURE: CPT

## 2022-03-16 PROCEDURE — 84270 ASSAY OF SEX HORMONE GLOBUL: CPT

## 2022-03-16 PROCEDURE — 84403 ASSAY OF TOTAL TESTOSTERONE: CPT

## 2022-03-16 PROCEDURE — 84402 ASSAY OF FREE TESTOSTERONE: CPT

## 2022-03-16 PROCEDURE — 85027 COMPLETE CBC AUTOMATED: CPT

## 2022-03-16 PROCEDURE — 80061 LIPID PANEL: CPT

## 2022-03-16 PROCEDURE — 82670 ASSAY OF TOTAL ESTRADIOL: CPT

## 2022-03-16 PROCEDURE — 80048 BASIC METABOLIC PNL TOTAL CA: CPT

## 2022-03-21 LAB
SHBG SERPL-SCNC: 37 NMOL/L (ref 19–76)
TESTOST FREE MFR SERPL: 1.6 % (ref 1.6–2.9)
TESTOST FREE SERPL-MCNC: 19 PG/ML (ref 47–244)
TESTOST SERPL-MCNC: 117 NG/DL (ref 300–890)

## 2022-04-04 ENCOUNTER — OCCUPATIONAL MEDICINE (OUTPATIENT)
Dept: OCCUPATIONAL MEDICINE | Facility: OTHER | Age: 57
End: 2022-04-04
Payer: COMMERCIAL

## 2022-04-04 VITALS
WEIGHT: 215 LBS | OXYGEN SATURATION: 96 % | DIASTOLIC BLOOD PRESSURE: 84 MMHG | TEMPERATURE: 99.2 F | HEIGHT: 70 IN | BODY MASS INDEX: 30.78 KG/M2 | HEART RATE: 79 BPM | SYSTOLIC BLOOD PRESSURE: 118 MMHG

## 2022-04-04 DIAGNOSIS — Q21.12 PFO (PATENT FORAMEN OVALE): ICD-10-CM

## 2022-04-04 DIAGNOSIS — R06.02 SOBOE (SHORTNESS OF BREATH ON EXERTION): ICD-10-CM

## 2022-04-04 DIAGNOSIS — R09.02 HYPOXIA: ICD-10-CM

## 2022-04-04 PROCEDURE — 99214 OFFICE O/P EST MOD 30 MIN: CPT | Performed by: FAMILY MEDICINE

## 2022-04-04 RX ORDER — TESTOSTERONE CYPIONATE 200 MG/ML
INJECTION, SOLUTION INTRAMUSCULAR
COMMUNITY
Start: 2022-03-09 | End: 2022-04-19

## 2022-04-04 ASSESSMENT — ENCOUNTER SYMPTOMS
CONSTITUTIONAL NEGATIVE: 1
CARDIOVASCULAR NEGATIVE: 1

## 2022-04-04 ASSESSMENT — FIBROSIS 4 INDEX: FIB4 SCORE: 1

## 2022-04-04 NOTE — LETTER
2022    Patient: Jason Ray  : 1965  Provider: Ramesh Bejarano M.D.    RETURN TO WORK    BODY PART: HEART  EMPLOYER:[employer]    It is the inured worker's responsibility to inform the employer of current work status.    CURRENT RESTRICTIONS: LIGHT DUTY    LIMIT EXTREME EXERTION     CONDITION STABLE: YES  CONDITION RATABLE: YES    RETURN VISIT: Return if symptoms worsen or fail to improve.    Electronically Signed: Ramesh Bejarano M.D.

## 2022-04-04 NOTE — LETTER
2022    Patient: Jason Ray  : 1965  Provider: Ramesh Bejarano M.D.    RETURN TO WORK    BODY PART: HEART  EMPLOYER:[employer]    It is the inured worker's responsibility to inform the employer of current work status.    CURRENT RESTRICTIONS: LIGHT DUTY    LIMIT EXTREME EXERTION     CONDITION STABLE: NO  CONDITION RATABLE: NO    RETURN VISIT: Return if symptoms worsen or fail to improve.    Electronically Signed: Ramesh Bejarano M.D.

## 2022-04-04 NOTE — PROGRESS NOTES
" Subjective:   Jason Ray is a 56 y.o. male here for the evaluation and management of Follow-Up (W/C follow up)    Patent foramen ovale with associated exertional hypoxia and shortness of breath-stable, largely unchanged.  Review and discuss previous cardiology and pulmonary opinions.  We discussed the treatment option as recommended by the most recent cardiologist of consideration for patent foramen ovale closure.  Patient has investigated the surgery and feels it is too high risk.  He would like to conclude his claim.  No problems updated.    Review of Systems   Constitutional: Negative.    Cardiovascular: Negative.       Objective:     Vitals:    04/04/22 1452   BP: 118/84   BP Location: Left arm   Patient Position: Sitting   Pulse: 79   Temp: 37.3 °C (99.2 °F)   SpO2: 96%   Weight: 97.5 kg (215 lb)   Height: 1.778 m (5' 10\")     Body mass index is 30.85 kg/m².     Physical Exam  Constitutional:       Appearance: Normal appearance.   HENT:      Head: Normocephalic.   Eyes:      Extraocular Movements: Extraocular movements intact.      Conjunctiva/sclera: Conjunctivae normal.   Neurological:      Mental Status: He is alert. Mental status is at baseline.   Psychiatric:         Mood and Affect: Mood normal.         Behavior: Behavior normal.         Assessment and Plan:   Jason Ray is a 56 y.o. male with a Follow-Up (W/C follow up)     \"patient with exertional hypoxia, mild restrictive lung disease, structural heart disease with patent foramen ovale, atrial septal aneurysm.  I personally reviewed his echocardiogram, he has structural heart disease with patent PFO, atrial septal aneurysm with large right to left shunt with Valsalva.  I suspect his right heart pressures increase with exertion due to his mild restrictive lung disease which makes his shunt worse  leading to hypoxemia and dyspnea on exertion.\"  From dr. Quach's note on 11/16/21    From the [place] guidelines for competitive " "athletes:  \"Athletes with an ASD and mild pulmonary hypertension can participate in low-intensity competitive  sports (class IA). Patients with associated pulmonary  vascular obstructive disease who have cyanosis and a  large right-to-left shunt cannot participate in competitive sports.\"  [email or web address]://www.sads.org/images/stories/exercise/[email or web address]    Reviewed and discussed previous cardiology opinion which I quoted above.  Consulted guidelines about returning to high-level physical activity with his current condition and athletic guidelines would not support this plan.  In summary I recommend permanent restrictions based on his decision to forego surgical closure based on risk-benefit considerations.  Follow-up as needed    Problem List Items Addressed This Visit    None         Followup: Return if symptoms worsen or fail to improve.    Ramesh Bejarano M.D.    Please note that this dictation was created using voice recognition software. I have made every reasonable attempt to correct obvious errors, but I expect that there are errors of grammar and possibly content that I did not discover before finalizing the note.  "

## 2022-04-19 ENCOUNTER — TELEPHONE (OUTPATIENT)
Dept: CARDIOLOGY | Facility: MEDICAL CENTER | Age: 57
End: 2022-04-19
Payer: COMMERCIAL

## 2022-04-19 ENCOUNTER — OFFICE VISIT (OUTPATIENT)
Dept: CARDIOLOGY | Facility: MEDICAL CENTER | Age: 57
End: 2022-04-19
Payer: COMMERCIAL

## 2022-04-19 VITALS
DIASTOLIC BLOOD PRESSURE: 100 MMHG | HEART RATE: 72 BPM | HEIGHT: 70 IN | BODY MASS INDEX: 31.35 KG/M2 | RESPIRATION RATE: 16 BRPM | OXYGEN SATURATION: 96 % | WEIGHT: 219 LBS | SYSTOLIC BLOOD PRESSURE: 152 MMHG

## 2022-04-19 DIAGNOSIS — R06.02 SOBOE (SHORTNESS OF BREATH ON EXERTION): ICD-10-CM

## 2022-04-19 DIAGNOSIS — Q21.12 PFO (PATENT FORAMEN OVALE): ICD-10-CM

## 2022-04-19 PROCEDURE — 99214 OFFICE O/P EST MOD 30 MIN: CPT | Performed by: INTERNAL MEDICINE

## 2022-04-19 ASSESSMENT — ENCOUNTER SYMPTOMS
PALPITATIONS: 0
DOUBLE VISION: 0
FEVER: 0
ABDOMINAL PAIN: 0
CLAUDICATION: 0
RESPIRATORY NEGATIVE: 1
WEAKNESS: 0
EYES NEGATIVE: 1
DIZZINESS: 0
NEUROLOGICAL NEGATIVE: 1
BRUISES/BLEEDS EASILY: 0
BLURRED VISION: 0
SHORTNESS OF BREATH: 0
GASTROINTESTINAL NEGATIVE: 1
FOCAL WEAKNESS: 0
NAUSEA: 0
PSYCHIATRIC NEGATIVE: 1
NERVOUS/ANXIOUS: 0
DEPRESSION: 0
VOMITING: 0
COUGH: 0
CARDIOVASCULAR NEGATIVE: 1
MUSCULOSKELETAL NEGATIVE: 1
CONSTITUTIONAL NEGATIVE: 1
CHILLS: 0
HEADACHES: 0
MYALGIAS: 0
WEIGHT LOSS: 0

## 2022-04-19 ASSESSMENT — FIBROSIS 4 INDEX: FIB4 SCORE: 1

## 2022-04-19 NOTE — TELEPHONE ENCOUNTER
----- Message from Nestor Walker M.D. sent at 4/19/2022  8:15 AM PDT -----  Please refer this patient to [facility] heart for patent foramen ovale closure. He was recommended by Yuval Bynum to undergo this procedure, however, he is still hesitant about undergoing this procedure. Alexander FRANCIS

## 2022-04-19 NOTE — PROGRESS NOTES
Chief Complaint   Patient presents with   • Other     F/V Dx: PFO (patent foramen ovale)   • Shortness of Breath     F/V Dx: SOBOE (shortness of breath on exertion)           Subjective     Jason Ray is a 56 y.o. male who presents today for follow up of patent foramen ovale.    Since the patient's last visit on 01/25/22, he has been doing well clinically. He admits to.shortness of breath. He denies chest pain, shortness of breath, palpitations, nausea/vomiting or diaphoresis. He was initially recommended to undergo patent foramen ovale closure by Yuval Bynum. He was then scheduled for procedure, however, he decided against the procedure due to risk and benefit.    Past Medical History:   Diagnosis Date   • Breath shortness    • Oxygen desaturation     with exercise   • PFO (patent foramen ovale)      Past Surgical History:   Procedure Laterality Date   • ZZZ CARDIAC CATH       Family History   Problem Relation Age of Onset   • Heart Disease Neg Hx      Social History     Socioeconomic History   • Marital status:      Spouse name: Not on file   • Number of children: Not on file   • Years of education: Not on file   • Highest education level: Not on file   Occupational History   • Not on file   Tobacco Use   • Smoking status: Never Smoker   • Smokeless tobacco: Never Used   Vaping Use   • Vaping Use: Never used   Substance and Sexual Activity   • Alcohol use: Yes     Comment: occ   • Drug use: Not Currently   • Sexual activity: Not on file   Other Topics Concern   • Not on file   Social History Narrative   • Not on file     Social Determinants of Health     Financial Resource Strain: Not on file   Food Insecurity: Not on file   Transportation Needs: Not on file   Physical Activity: Not on file   Stress: Not on file   Social Connections: Not on file   Intimate Partner Violence: Not on file   Housing Stability: Not on file     No Known Allergies     (Medications reviewed.)  Outpatient  "Encounter Medications as of 4/19/2022   Medication Sig Dispense Refill   • MAGNESIUM PO Take  by mouth.     • TESTOSTERONE CYPIONATE INJ Inject 140 mg as directed every 7 days.     • anastrozole (ARIMIDEX) 1 MG Tab Take 1 mg by mouth every 7 days. Indications: estrogen blocker     • [DISCONTINUED] testosterone cypionate (DEPO-TESTOSTERONE) 200 MG/ML Solution injection INJECT 1ML EVERY WEEK FOR 4 WEEKS       No facility-administered encounter medications on file as of 4/19/2022.     Review of Systems   Constitutional: Negative.  Negative for chills, fever, malaise/fatigue and weight loss.   HENT: Negative.  Negative for hearing loss.    Eyes: Negative.  Negative for blurred vision and double vision.   Respiratory: Negative.  Negative for cough and shortness of breath.    Cardiovascular: Negative.  Negative for chest pain, palpitations, claudication and leg swelling.   Gastrointestinal: Negative.  Negative for abdominal pain, nausea and vomiting.   Genitourinary: Negative.  Negative for dysuria and urgency.   Musculoskeletal: Negative.  Negative for joint pain and myalgias.   Skin: Negative.  Negative for itching and rash.   Neurological: Negative.  Negative for dizziness, focal weakness, weakness and headaches.   Endo/Heme/Allergies: Negative.  Does not bruise/bleed easily.   Psychiatric/Behavioral: Negative.  Negative for depression. The patient is not nervous/anxious.               Objective     /100 (BP Location: Left arm, Patient Position: Sitting, BP Cuff Size: Adult)   Pulse 72   Resp 16   Ht 1.778 m (5' 10\")   Wt 99.3 kg (219 lb)   SpO2 96%   BMI 31.42 kg/m²     Physical Exam  Constitutional:       Appearance: He is well-developed.   HENT:      Head: Normocephalic and atraumatic.   Neck:      Vascular: No JVD.   Cardiovascular:      Rate and Rhythm: Normal rate and regular rhythm.      Heart sounds: Normal heart sounds.   Pulmonary:      Effort: Pulmonary effort is normal.      Breath sounds: " Normal breath sounds.   Abdominal:      General: Bowel sounds are normal.      Palpations: Abdomen is soft.      Comments: No hepatosplenomegaly.   Musculoskeletal:         General: Normal range of motion.   Lymphadenopathy:      Cervical: No cervical adenopathy.   Skin:     General: Skin is warm and dry.   Neurological:      Mental Status: He is alert and oriented to person, place, and time.            CARDIAC STUDIES/PROCEDURES:     CARDIAC CATHETERIZATION CONCLUSIONS by Antonio Mina (09/03/21)  Normal right heart intracardiac pressures and cardiac output.    CARDIAC MRI (07/02/21)  1.  Calculated left ventricular ejection fraction of 51%  2.  Calculated right ventricular ejection fraction of 52%  3.  Mildly enlarged atria  4.  No partial anomalous pulmonary venous return appreciated on MRA  5.  No shunt is appreciated by volumetric analysis. Phase contrast in the volumetric analysis are discordant. Qp:Qs ratio of 0.55 could conceivably be related to inspiratory breath hold during scanning resulting in a left to right shunt as seen on prior echocardiogram.     ECHOCARDIOGRAM CONCLUSIONS at [facility]  (02/18/21)  1. The left ventricle is normal in size with normal systolic function. Ejection fraction of 60-65% by   visual estimation. Mild concentric left ventricular hypertrophy.   2. The right atrium is mildly dilated.   3. No significant valvular abnormalities.   4. Normal right ventricular size, wall thickness and function.   5. Saline bubble study performed with a few bubbles crossing the interatrial septum most consistent w   ith a small PFO.     EKG performed on (09/01/21) was reviewed: EKG personally interpreted shows sinus bradycardia     Laboratory results of (03/16/22) were reviewed. Cholesterol profile of 240/205/59/140 mg/dL noted.  Laboratory results of (08/20/21) were reviewed. Cholesterol profile of 231/142/51/152 mg/dL noted.     Assessment & Plan     1. PFO (patent foramen ovale)      2. SOBOE (shortness of breath on exertion)         Medical Decision Making: Today's Assessment/Status/Plan:        1. Patent foramen ovale: He is symptomatic, however, he remains undecided to undergo closure. We will refer to [facility] for their opinion.  2. Additional information: Of note, we were classmates in college and he has worked as a . He was previously followed by Corey Segundo and Dr. Donis Mathew.    We will follow up in 6 months.    CC Ramesh Sanchez

## 2022-07-08 NOTE — LETTER
2022    Patient: Jason Ray  : 1965  Provider: Ramesh Bejarano M.D.    RETURN TO WORK    BODY PART: HEART  EMPLOYER:[employer]    It is the inured worker's responsibility to inform the employer of current work status.    CURRENT RESTRICTIONS: LIGHT DUTY    Limit physical exertion to avoid exertional hypoxia by keeping heart rate below 140 beats per minute.    See evidence- based guidelines: Eligibility and Disqualification Recommendations for Competitive Athletes With Cardiovascular Abnormalities: Task Force 4: Congenital Heart Disease: A Scientific Statement From the American Heart Association and American College of Cardiology. Circulation. 2015 Dec 1;132(22):e281-91. doi: 10.1161/CIR.2833315915493911. Epub 2015. PMID: 28372126.    CONDITION STABLE: YES  CONDITION RATABLE: YES    RETURN VISIT: No follow-ups on file.    Electronically Signed: Ramesh Bejarano M.D.

## 2022-07-08 NOTE — PROGRESS NOTES
Clarification requested regarding work restrictions.  Medical decision making based on cardiac and pulmonary consultations and review of below medical literature:  Lonnie Hull et al. “Reference Standards for Cardiorespiratory Fitness Measured With Cardiopulmonary Exercise Testing: Data From the Fitness Registry and the Importance of Exercise National Database.” [facility] proceedings vol. 90,11 (2015): 1515-23. doi:10.1016/j.mayocp.2015.07.026  Tila K, Shorty M, Valente N, et al.  Exertional hypoxia in a healthy adult: a pulmonary arteriovenous malformation.  BMJ Case Reports CP 2019;12:s095091.  Du Guy ESTEFANIA, Phillips S, Ian R, Camp P, Lety K, Consuelo M, Sage PG, Sanket JA, Julio CJ. Exertional hypoxemia is more severe in fibrotic interstitial lung disease than in COPD. Respirology. 2018 Apr;23(4):392-398. doi: 10.1111/resp.79179. Epub 2017 Nov 28. PMID: 48054231.  Scott Cleveland, Jon Longoria, Ambrose Zambrano, Rebekah May, Rebekah Faria, Corey Diaz.  Severe Exercise-Induced Hypoxemia.  Respiratory Care Jul 2012, 57 (7) 4856-2948; DOI: 10.4187/respcare.19120  Kennedy S, Ashley DOCKERY, Nidhi N, Bianka L, Usman S. An unusual case of persisting hypoxia in a patient with a thrombolysed pulmonary embolism. Clin Med ([place]). 2020 Nov;20(6):593-596. doi: 10.7861/clinmed.5619-1467. PMID: 67297837; PMCID: DTV8007579.  Miah MURO. Dyspnea, Orthopnea, and Paroxysmal Nocturnal Dyspnea. In: Walker HK, Bejarano WD, Rosas JW, editors. Clinical Methods: The History, Physical, and Laboratory Examinations. 3rd ed. [place]: [place]; 1990. Chapter 11. PMID: 63005737.  American Thoracic Society; American College of Chest Physicians. ATS/ACCP Statement on cardiopulmonary exercise testing. Am J Respir Crit Care Med. 2003 [place] 15;167(2):211-77. doi: 10.1164/rccm.167.2.211. Erratum in: Am J Respir Crit Care Med. 2003 May 15;1451-2. PMID: 15766708.  Paul HEARD, Daphney ALFARO, Carlo MOYA, Sushma ENCISO, Shayne ENCISO, Eryn  KM, Johanny ZEE, [place] RL; American Heart Association Electrocardiography and Arrhythmias Committee of [place] on Clinical Cardiology, [place] on Cardiovascular Disease in [place], [place] on Cardiovascular and Stroke Nursing, [place] on Functional Genomics and Translational Biology, and American College of Cardiology. Eligibility and Disqualification Recommendations for Competitive Athletes With Cardiovascular Abnormalities: Task Force 4: Congenital Heart Disease: A Scientific Statement From the American Heart Association and American College of Cardiology. Circulation. 2015 Dec 1;132(22):e281-91. doi: 10.1161/CIR.1453684810174060. Epub 2015 Nov 2. PMID: 52203623.

## 2022-07-15 ENCOUNTER — OCCUPATIONAL MEDICINE (OUTPATIENT)
Dept: OCCUPATIONAL MEDICINE | Facility: OTHER | Age: 57
End: 2022-07-15
Payer: COMMERCIAL

## 2022-07-15 VITALS
WEIGHT: 207 LBS | DIASTOLIC BLOOD PRESSURE: 70 MMHG | HEIGHT: 70 IN | BODY MASS INDEX: 29.63 KG/M2 | SYSTOLIC BLOOD PRESSURE: 118 MMHG | TEMPERATURE: 98 F | OXYGEN SATURATION: 96 % | HEART RATE: 80 BPM

## 2022-07-15 DIAGNOSIS — R09.02 HYPOXIA: ICD-10-CM

## 2022-07-15 DIAGNOSIS — Q21.12 PFO (PATENT FORAMEN OVALE): ICD-10-CM

## 2022-07-15 DIAGNOSIS — R06.02 SOBOE (SHORTNESS OF BREATH ON EXERTION): ICD-10-CM

## 2022-07-15 PROCEDURE — 99213 OFFICE O/P EST LOW 20 MIN: CPT | Performed by: FAMILY MEDICINE

## 2022-07-15 ASSESSMENT — ENCOUNTER SYMPTOMS
CONSTITUTIONAL NEGATIVE: 1
SHORTNESS OF BREATH: 1

## 2022-07-15 ASSESSMENT — FIBROSIS 4 INDEX: FIB4 SCORE: 1

## 2022-07-15 NOTE — LETTER
7/15/2022    Patient: Jason Ray  : 1965  Provider: Ramesh Bejarano M.D.    RETURN TO WORK    BODY PART: HEART WITH RESTRICTIVE LUNG DISEASE  EMPLOYER: [employer]    It is the inured worker's responsibility to inform the employer of current work status.    PERMANENT RESTRICTIONS:     Limit physical exertion to avoid exertional hypoxia by keeping heart rate below 140 beats per minute.    See evidence- based guidelines: Eligibility and Disqualification Recommendations for Competitive Athletes With Cardiovascular Abnormalities: Task Force 4: Congenital Heart Disease: A Scientific Statement From the American Heart Association and American College of Cardiology. Circulation. 2015 Dec 1;132(22):e281-91. doi: 10.1161/CIR.4252790290219971. Epub 2015. PMID: 19275158.    CONDITION STABLE: YES  CONDITION RATABLE: YES    RETURN VISIT: No follow-ups on file.    RETURN VISIT: Return if symptoms worsen or fail to improve.    Electronically Signed: Ramesh Bejarano M.D.

## 2022-07-16 NOTE — PROGRESS NOTES
" Subjective:   Jason Ray is a 56 y.o. male here for the evaluation and management of Follow-Up (W/C follow up heart/lungs)    Exertional hypoxia oh with identification of patent foramen ovale and associated mildly restrictive lung disease-patient reports ongoing symptoms with exertion, he specifically mentions hiking and inability to hike for extended period of time without stopping to rest.  No problems updated.    Review of Systems   Constitutional: Negative.    Respiratory: Positive for shortness of breath.       Objective:     Vitals:    07/15/22 1026   BP: 118/70   BP Location: Right arm   Patient Position: Sitting   Pulse: 80   Temp: 36.7 °C (98 °F)   SpO2: 96%   Weight: 93.9 kg (207 lb)   Height: 1.778 m (5' 10\")     Body mass index is 29.7 kg/m².     Physical Exam  Constitutional:       Appearance: Normal appearance.   HENT:      Head: Normocephalic.   Eyes:      Extraocular Movements: Extraocular movements intact.      Conjunctiva/sclera: Conjunctivae normal.   Neurological:      Mental Status: He is alert. Mental status is at baseline.   Psychiatric:         Mood and Affect: Mood normal.         Behavior: Behavior normal.         Assessment and Plan:   Jason Ray is a 56 y.o. male with a Follow-Up (W/C follow up heart/lungs)     Discussed work restrictions, we reviewed recent information from a hearing decision, from my standpoint he is stable and permanent with no further treatment beyond patent foramen ovale closure at his discretion    Problem List Items Addressed This Visit    None         Followup: Return if symptoms worsen or fail to improve.    Ramesh Bejarano M.D.    Please note that this dictation was created using voice recognition software. I have made every reasonable attempt to correct obvious errors, but I expect that there are errors of grammar and possibly content that I did not discover before finalizing the note.  "

## 2022-10-03 ENCOUNTER — HOSPITAL ENCOUNTER (OUTPATIENT)
Dept: LAB | Facility: MEDICAL CENTER | Age: 57
End: 2022-10-03
Attending: FAMILY MEDICINE
Payer: COMMERCIAL

## 2022-10-03 LAB
ANION GAP SERPL CALC-SCNC: 12 MMOL/L (ref 7–16)
BUN SERPL-MCNC: 26 MG/DL (ref 8–22)
CALCIUM SERPL-MCNC: 9.3 MG/DL (ref 8.5–10.5)
CHLORIDE SERPL-SCNC: 102 MMOL/L (ref 96–112)
CHOLEST SERPL-MCNC: 213 MG/DL (ref 100–199)
CO2 SERPL-SCNC: 23 MMOL/L (ref 20–33)
CREAT SERPL-MCNC: 1.46 MG/DL (ref 0.5–1.4)
ERYTHROCYTE [DISTWIDTH] IN BLOOD BY AUTOMATED COUNT: 47.1 FL (ref 35.9–50)
ESTRADIOL SERPL-MCNC: 7.7 PG/ML
FASTING STATUS PATIENT QL REPORTED: NORMAL
GFR SERPLBLD CREATININE-BSD FMLA CKD-EPI: 56 ML/MIN/1.73 M 2
GLUCOSE SERPL-MCNC: 89 MG/DL (ref 65–99)
HCT VFR BLD AUTO: 48.6 % (ref 42–52)
HDLC SERPL-MCNC: 57 MG/DL
HGB BLD-MCNC: 17.1 G/DL (ref 14–18)
LDLC SERPL CALC-MCNC: 91 MG/DL
MCH RBC QN AUTO: 33.7 PG (ref 27–33)
MCHC RBC AUTO-ENTMCNC: 35.2 G/DL (ref 33.7–35.3)
MCV RBC AUTO: 95.7 FL (ref 81.4–97.8)
PLATELET # BLD AUTO: 207 K/UL (ref 164–446)
PMV BLD AUTO: 11.5 FL (ref 9–12.9)
POTASSIUM SERPL-SCNC: 4.1 MMOL/L (ref 3.6–5.5)
RBC # BLD AUTO: 5.08 M/UL (ref 4.7–6.1)
SODIUM SERPL-SCNC: 137 MMOL/L (ref 135–145)
TRIGL SERPL-MCNC: 327 MG/DL (ref 0–149)
WBC # BLD AUTO: 6.2 K/UL (ref 4.8–10.8)

## 2022-10-03 PROCEDURE — 84270 ASSAY OF SEX HORMONE GLOBUL: CPT

## 2022-10-03 PROCEDURE — 80048 BASIC METABOLIC PNL TOTAL CA: CPT

## 2022-10-03 PROCEDURE — 36415 COLL VENOUS BLD VENIPUNCTURE: CPT

## 2022-10-03 PROCEDURE — 84402 ASSAY OF FREE TESTOSTERONE: CPT

## 2022-10-03 PROCEDURE — 84403 ASSAY OF TOTAL TESTOSTERONE: CPT

## 2022-10-03 PROCEDURE — 85027 COMPLETE CBC AUTOMATED: CPT

## 2022-10-03 PROCEDURE — 80061 LIPID PANEL: CPT

## 2022-10-03 PROCEDURE — 82670 ASSAY OF TOTAL ESTRADIOL: CPT

## 2022-10-07 LAB
SHBG SERPL-SCNC: 36 NMOL/L (ref 19–76)
TESTOST FREE MFR SERPL: 1.9 % (ref 1.6–2.9)
TESTOST FREE SERPL-MCNC: 123 PG/ML (ref 47–244)
TESTOST SERPL-MCNC: 654 NG/DL (ref 300–890)